# Patient Record
Sex: FEMALE | Race: WHITE | NOT HISPANIC OR LATINO | Employment: OTHER | URBAN - METROPOLITAN AREA
[De-identification: names, ages, dates, MRNs, and addresses within clinical notes are randomized per-mention and may not be internally consistent; named-entity substitution may affect disease eponyms.]

---

## 2019-10-09 ENCOUNTER — OFFICE VISIT - HEALTHEAST (OUTPATIENT)
Dept: INTERNAL MEDICINE | Facility: CLINIC | Age: 74
End: 2019-10-09

## 2019-10-09 DIAGNOSIS — F51.02 ADJUSTMENT INSOMNIA: ICD-10-CM

## 2019-10-09 DIAGNOSIS — F33.2 SEVERE RECURRENT MAJOR DEPRESSION WITHOUT PSYCHOTIC FEATURES (H): ICD-10-CM

## 2019-10-09 DIAGNOSIS — E66.811 CLASS 1 OBESITY DUE TO EXCESS CALORIES WITHOUT SERIOUS COMORBIDITY WITH BODY MASS INDEX (BMI) OF 30.0 TO 30.9 IN ADULT: ICD-10-CM

## 2019-10-09 DIAGNOSIS — Z23 NEED FOR STREPTOCOCCUS PNEUMONIAE VACCINATION: ICD-10-CM

## 2019-10-09 DIAGNOSIS — E66.09 CLASS 1 OBESITY DUE TO EXCESS CALORIES WITHOUT SERIOUS COMORBIDITY WITH BODY MASS INDEX (BMI) OF 30.0 TO 30.9 IN ADULT: ICD-10-CM

## 2019-10-09 DIAGNOSIS — Z12.11 COLON CANCER SCREENING: ICD-10-CM

## 2019-10-09 DIAGNOSIS — Z12.31 VISIT FOR SCREENING MAMMOGRAM: ICD-10-CM

## 2019-10-09 ASSESSMENT — PATIENT HEALTH QUESTIONNAIRE - PHQ9: SUM OF ALL RESPONSES TO PHQ QUESTIONS 1-9: 4

## 2019-11-11 ENCOUNTER — OFFICE VISIT - HEALTHEAST (OUTPATIENT)
Dept: INTERNAL MEDICINE | Facility: CLINIC | Age: 74
End: 2019-11-11

## 2019-11-11 ENCOUNTER — COMMUNICATION - HEALTHEAST (OUTPATIENT)
Dept: NURSING | Facility: CLINIC | Age: 74
End: 2019-11-11

## 2019-11-11 ENCOUNTER — COMMUNICATION - HEALTHEAST (OUTPATIENT)
Dept: INTERNAL MEDICINE | Facility: CLINIC | Age: 74
End: 2019-11-11

## 2019-11-11 DIAGNOSIS — E66.811 CLASS 1 OBESITY WITH BODY MASS INDEX (BMI) OF 31.0 TO 31.9 IN ADULT, UNSPECIFIED OBESITY TYPE, UNSPECIFIED WHETHER SERIOUS COMORBIDITY PRESENT: ICD-10-CM

## 2019-11-11 DIAGNOSIS — Z11.59 ENCOUNTER FOR HEPATITIS C SCREENING TEST FOR LOW RISK PATIENT: ICD-10-CM

## 2019-11-11 DIAGNOSIS — Z00.00 ROUTINE GENERAL MEDICAL EXAMINATION AT A HEALTH CARE FACILITY: ICD-10-CM

## 2019-11-11 DIAGNOSIS — M85.80 OSTEOPENIA, UNSPECIFIED LOCATION: ICD-10-CM

## 2019-11-11 DIAGNOSIS — F51.02 ADJUSTMENT INSOMNIA: ICD-10-CM

## 2019-11-11 DIAGNOSIS — F33.2 SEVERE RECURRENT MAJOR DEPRESSION WITHOUT PSYCHOTIC FEATURES (H): ICD-10-CM

## 2019-11-11 DIAGNOSIS — F17.210 CIGARETTE NICOTINE DEPENDENCE WITHOUT COMPLICATION: ICD-10-CM

## 2019-11-11 LAB
CHOLEST SERPL-MCNC: 203 MG/DL
FASTING STATUS PATIENT QL REPORTED: YES
HBA1C MFR BLD: 6 % (ref 3.5–6)
HDLC SERPL-MCNC: 67 MG/DL
LDLC SERPL CALC-MCNC: 123 MG/DL
TRIGL SERPL-MCNC: 66 MG/DL

## 2019-11-11 ASSESSMENT — ANXIETY QUESTIONNAIRES
GAD7 TOTAL SCORE: 5
6. BECOMING EASILY ANNOYED OR IRRITABLE: MORE THAN HALF THE DAYS
IF YOU CHECKED OFF ANY PROBLEMS ON THIS QUESTIONNAIRE, HOW DIFFICULT HAVE THESE PROBLEMS MADE IT FOR YOU TO DO YOUR WORK, TAKE CARE OF THINGS AT HOME, OR GET ALONG WITH OTHER PEOPLE: NOT DIFFICULT AT ALL
4. TROUBLE RELAXING: MORE THAN HALF THE DAYS
2. NOT BEING ABLE TO STOP OR CONTROL WORRYING: NOT AT ALL
5. BEING SO RESTLESS THAT IT IS HARD TO SIT STILL: NOT AT ALL
7. FEELING AFRAID AS IF SOMETHING AWFUL MIGHT HAPPEN: NOT AT ALL
1. FEELING NERVOUS, ANXIOUS, OR ON EDGE: SEVERAL DAYS
3. WORRYING TOO MUCH ABOUT DIFFERENT THINGS: NOT AT ALL

## 2019-11-11 ASSESSMENT — MIFFLIN-ST. JEOR: SCORE: 1350.78

## 2019-11-11 ASSESSMENT — PATIENT HEALTH QUESTIONNAIRE - PHQ9: SUM OF ALL RESPONSES TO PHQ QUESTIONS 1-9: 1

## 2019-11-12 LAB — HCV AB SERPL QL IA: NEGATIVE

## 2019-11-14 ENCOUNTER — COMMUNICATION - HEALTHEAST (OUTPATIENT)
Dept: INTERNAL MEDICINE | Facility: CLINIC | Age: 74
End: 2019-11-14

## 2020-08-05 ENCOUNTER — COMMUNICATION - HEALTHEAST (OUTPATIENT)
Dept: INTERNAL MEDICINE | Facility: CLINIC | Age: 75
End: 2020-08-05

## 2020-08-05 DIAGNOSIS — F33.2 SEVERE RECURRENT MAJOR DEPRESSION WITHOUT PSYCHOTIC FEATURES (H): ICD-10-CM

## 2020-08-05 RX ORDER — VENLAFAXINE HYDROCHLORIDE 75 MG/1
CAPSULE, EXTENDED RELEASE ORAL
Qty: 90 CAPSULE | Refills: 0 | Status: SHIPPED | OUTPATIENT
Start: 2020-08-05

## 2020-10-26 ENCOUNTER — COMMUNICATION - HEALTHEAST (OUTPATIENT)
Dept: INTERNAL MEDICINE | Facility: CLINIC | Age: 75
End: 2020-10-26

## 2020-11-19 ENCOUNTER — OFFICE VISIT - HEALTHEAST (OUTPATIENT)
Dept: INTERNAL MEDICINE | Facility: CLINIC | Age: 75
End: 2020-11-19

## 2020-11-19 DIAGNOSIS — Z20.822 SUSPECTED COVID-19 VIRUS INFECTION: ICD-10-CM

## 2020-11-19 DIAGNOSIS — F33.2 SEVERE RECURRENT MAJOR DEPRESSION WITHOUT PSYCHOTIC FEATURES (H): ICD-10-CM

## 2020-11-21 ENCOUNTER — AMBULATORY - HEALTHEAST (OUTPATIENT)
Dept: FAMILY MEDICINE | Facility: CLINIC | Age: 75
End: 2020-11-21

## 2020-11-21 DIAGNOSIS — Z20.822 SUSPECTED COVID-19 VIRUS INFECTION: ICD-10-CM

## 2020-11-23 ENCOUNTER — COMMUNICATION - HEALTHEAST (OUTPATIENT)
Dept: SCHEDULING | Facility: CLINIC | Age: 75
End: 2020-11-23

## 2020-11-24 ENCOUNTER — OFFICE VISIT - HEALTHEAST (OUTPATIENT)
Dept: INTERNAL MEDICINE | Facility: CLINIC | Age: 75
End: 2020-11-24

## 2020-11-24 DIAGNOSIS — Z78.0 POST-MENOPAUSAL: ICD-10-CM

## 2020-11-24 DIAGNOSIS — Z91.89 RISK FOR CORONARY ARTERY DISEASE BETWEEN 10% AND 20% IN NEXT 10 YEARS: ICD-10-CM

## 2020-11-24 DIAGNOSIS — Z87.891 ENCOUNTER FOR SCREENING FOR ABDOMINAL AORTIC ANEURYSM (AAA) IN PATIENT 50 YEARS OF AGE OR OLDER WITH HISTORY OF SMOKING: ICD-10-CM

## 2020-11-24 DIAGNOSIS — Z12.11 SCREEN FOR COLON CANCER: ICD-10-CM

## 2020-11-24 DIAGNOSIS — M85.80 OSTEOPENIA, UNSPECIFIED LOCATION: ICD-10-CM

## 2020-11-24 DIAGNOSIS — F33.2 SEVERE RECURRENT MAJOR DEPRESSION WITHOUT PSYCHOTIC FEATURES (H): ICD-10-CM

## 2020-11-24 DIAGNOSIS — Z12.11 COLON CANCER SCREENING: ICD-10-CM

## 2020-11-24 DIAGNOSIS — Z12.31 VISIT FOR SCREENING MAMMOGRAM: ICD-10-CM

## 2020-11-24 DIAGNOSIS — R73.03 PREDIABETES: ICD-10-CM

## 2020-11-24 DIAGNOSIS — E66.811 OBESITY (BMI 30.0-34.9): ICD-10-CM

## 2020-11-24 DIAGNOSIS — F17.210 CIGARETTE NICOTINE DEPENDENCE WITHOUT COMPLICATION: ICD-10-CM

## 2020-11-24 DIAGNOSIS — Z13.6 ENCOUNTER FOR SCREENING FOR ABDOMINAL AORTIC ANEURYSM (AAA) IN PATIENT 50 YEARS OF AGE OR OLDER WITH HISTORY OF SMOKING: ICD-10-CM

## 2020-11-24 LAB
ALBUMIN SERPL-MCNC: 3.9 G/DL (ref 3.5–5)
ALP SERPL-CCNC: 118 U/L (ref 45–120)
ALT SERPL W P-5'-P-CCNC: 21 U/L (ref 0–45)
ANION GAP SERPL CALCULATED.3IONS-SCNC: 10 MMOL/L (ref 5–18)
AST SERPL W P-5'-P-CCNC: 15 U/L (ref 0–40)
BILIRUB SERPL-MCNC: 0.7 MG/DL (ref 0–1)
BUN SERPL-MCNC: 14 MG/DL (ref 8–28)
CALCIUM SERPL-MCNC: 9.3 MG/DL (ref 8.5–10.5)
CHLORIDE BLD-SCNC: 106 MMOL/L (ref 98–107)
CHOLEST SERPL-MCNC: 184 MG/DL
CO2 SERPL-SCNC: 24 MMOL/L (ref 22–31)
CREAT SERPL-MCNC: 0.74 MG/DL (ref 0.6–1.1)
FASTING STATUS PATIENT QL REPORTED: NORMAL
GFR SERPL CREATININE-BSD FRML MDRD: >60 ML/MIN/1.73M2
GLUCOSE BLD-MCNC: 139 MG/DL (ref 70–125)
HDLC SERPL-MCNC: 63 MG/DL
LDLC SERPL CALC-MCNC: 108 MG/DL
POTASSIUM BLD-SCNC: 4.2 MMOL/L (ref 3.5–5)
PROT SERPL-MCNC: 7 G/DL (ref 6–8)
SODIUM SERPL-SCNC: 140 MMOL/L (ref 136–145)
TRIGL SERPL-MCNC: 65 MG/DL

## 2020-11-24 ASSESSMENT — PATIENT HEALTH QUESTIONNAIRE - PHQ9: SUM OF ALL RESPONSES TO PHQ QUESTIONS 1-9: 1

## 2020-11-24 ASSESSMENT — MIFFLIN-ST. JEOR: SCORE: 1363.85

## 2020-11-25 ENCOUNTER — AMBULATORY - HEALTHEAST (OUTPATIENT)
Dept: INTERNAL MEDICINE | Facility: CLINIC | Age: 75
End: 2020-11-25

## 2020-11-25 ENCOUNTER — COMMUNICATION - HEALTHEAST (OUTPATIENT)
Dept: INTERNAL MEDICINE | Facility: CLINIC | Age: 75
End: 2020-11-25

## 2020-11-25 DIAGNOSIS — E56.9 VITAMIN DEFICIENCY: ICD-10-CM

## 2020-11-25 DIAGNOSIS — M85.80 OSTEOPENIA, UNSPECIFIED LOCATION: ICD-10-CM

## 2020-11-25 LAB — 25(OH)D3 SERPL-MCNC: 11.1 NG/ML (ref 30–80)

## 2020-12-01 ENCOUNTER — COMMUNICATION - HEALTHEAST (OUTPATIENT)
Dept: INTERNAL MEDICINE | Facility: CLINIC | Age: 75
End: 2020-12-01

## 2020-12-11 ENCOUNTER — COMMUNICATION - HEALTHEAST (OUTPATIENT)
Dept: GERIATRIC MEDICINE | Facility: CLINIC | Age: 75
End: 2020-12-11

## 2020-12-21 ENCOUNTER — COMMUNICATION - HEALTHEAST (OUTPATIENT)
Dept: GERIATRIC MEDICINE | Facility: CLINIC | Age: 75
End: 2020-12-21

## 2021-01-15 ENCOUNTER — COMMUNICATION - HEALTHEAST (OUTPATIENT)
Dept: ADMINISTRATIVE | Facility: CLINIC | Age: 76
End: 2021-01-15

## 2021-03-11 ENCOUNTER — COMMUNICATION - HEALTHEAST (OUTPATIENT)
Dept: GERIATRIC MEDICINE | Facility: CLINIC | Age: 76
End: 2021-03-11

## 2021-03-17 ENCOUNTER — AMBULATORY - HEALTHEAST (OUTPATIENT)
Dept: NURSING | Facility: CLINIC | Age: 76
End: 2021-03-17

## 2021-04-07 ENCOUNTER — AMBULATORY - HEALTHEAST (OUTPATIENT)
Dept: NURSING | Facility: CLINIC | Age: 76
End: 2021-04-07

## 2021-05-26 ASSESSMENT — PATIENT HEALTH QUESTIONNAIRE - PHQ9
SUM OF ALL RESPONSES TO PHQ QUESTIONS 1-9: 4
SUM OF ALL RESPONSES TO PHQ QUESTIONS 1-9: 1

## 2021-05-27 ASSESSMENT — PATIENT HEALTH QUESTIONNAIRE - PHQ9: SUM OF ALL RESPONSES TO PHQ QUESTIONS 1-9: 1

## 2021-05-28 ASSESSMENT — ANXIETY QUESTIONNAIRES: GAD7 TOTAL SCORE: 5

## 2021-05-30 ENCOUNTER — RECORDS - HEALTHEAST (OUTPATIENT)
Dept: ADMINISTRATIVE | Facility: CLINIC | Age: 76
End: 2021-05-30

## 2021-06-02 ENCOUNTER — RECORDS - HEALTHEAST (OUTPATIENT)
Dept: ADMINISTRATIVE | Facility: CLINIC | Age: 76
End: 2021-06-02
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
## 2021-06-03 VITALS
OXYGEN SATURATION: 95 % | HEART RATE: 75 BPM | SYSTOLIC BLOOD PRESSURE: 136 MMHG | WEIGHT: 189 LBS | BODY MASS INDEX: 31.45 KG/M2 | DIASTOLIC BLOOD PRESSURE: 76 MMHG

## 2021-06-03 VITALS
OXYGEN SATURATION: 94 % | HEART RATE: 92 BPM | WEIGHT: 189.12 LBS | SYSTOLIC BLOOD PRESSURE: 128 MMHG | BODY MASS INDEX: 31.51 KG/M2 | DIASTOLIC BLOOD PRESSURE: 70 MMHG | HEIGHT: 65 IN

## 2021-06-03 NOTE — PROGRESS NOTES
Patient's consent lists sister as primary contact but does not include her phone number. CHW completed a chart review and discovered [facility] Telephone Encounter from 9/27:  Currently in inpatient and does not have a phone or number. If there are any question contact  at [facility] antonio [phone]. Routed message to PCP that patient cannot be reached via telephone or MyChart at this time.

## 2021-06-03 NOTE — PROGRESS NOTES
She is not an inpatient as I just saw her this afternoon.  She however endorsed that she does not have a current phone and is working on getting that resolved.  I hope this helps.

## 2021-06-03 NOTE — TELEPHONE ENCOUNTER
Refill Approved    Rx renewed per Medication Renewal Policy. Medication was last renewed on .  traZODone (DESYREL) 50 MG tablet 15 tablet 0 11/11/2019     Madelyn Rodrigues, [facility] Connection Triage/Med Refill 11/12/2019     Requested Prescriptions   Pending Prescriptions Disp Refills     traZODone (DESYREL) 50 MG tablet [Pharmacy Med Name: TRAZODONE 50MG TABLETS] 45 tablet 0     Sig: TAKE ONE-HALF TABLET AT BEDTIME AS NEEDED FOR SLEEP       Tricyclics/Misc Antidepressant/Antianxiety Meds Refill Protocol Passed - 11/11/2019  2:13 PM        Passed - PCP or prescribing provider visit in last year     Last office visit with prescriber/PCP: 10/9/2019 Aftab Clayton MD OR same dept: 10/9/2019 Aftab Clayton MD OR same specialty: 10/9/2019 Aftab Clayton MD  Last physical: 11/11/2019 Last MTM visit: Visit date not found   Next visit within 3 mo: Visit date not found  Next physical within 3 mo: Visit date not found  Prescriber OR PCP: Aftab Clayton MD  Last diagnosis associated with med order: 1. Adjustment insomnia  - traZODone (DESYREL) 50 MG tablet [Pharmacy Med Name: TRAZODONE 50MG TABLETS]; TAKE ONE-HALF TABLET AT BEDTIME AS NEEDED FOR SLEEP  Dispense: 45 tablet; Refill: 0    If protocol passes may refill for 12 months if within 3 months of last provider visit (or a total of 15 months).

## 2021-06-03 NOTE — PROGRESS NOTES
Assessment and Plan:   1. Adjustment insomnia  Well controlled.  Counseled patient to only use on a as needed basis.  Counseled to minimize alcohol use as much as possible.  - traZODone (DESYREL) 50 MG tablet; Take 0.5 tablets (25 mg total) by mouth at bedtime as needed for sleep (take only 1 tab in a 24 hour period).  Dispense: 15 tablet; Refill: 0    2. Severe recurrent major depression without psychotic features (H)  Well-controlled.  Counseled patient to consider establishing care with therapist.  - venlafaxine (EFFEXOR-XR) 75 MG 24 hr capsule; Take 1 capsule (75 mg total) by mouth daily with breakfast.  Dispense: 60 capsule; Refill: 2    3. Routine general medical examination at a health care facility  Have already placed consults for mammogram and for colonoscopy. Referred patient to care coordination in light of strong likelihood that she would not follow through with setting up either of the studies. Also provided instructions regarding establishing boosters for Tdap and administration of shingles vaccine from her pharmacy of choice and to have those records sent back to us once administered.    - Ambulatory referral to Care Management (Primary Care)    4. Osteopenia, unspecified location  Trying to obtain records from DEXA scan from 2009.  Records now show that she is losing height and has not been on calcium vitamin D supplement for quite some time. Might require repeat DEXA scan depending on what we find  - calcium-vitamin D (CALCIUM-VITAMIN D) 500 mg(1,250mg) -200 unit per tablet; Take 1 tablet by mouth 2 (two) times a day with meals.  Dispense: 100 tablet; Refill: 2    5. Class 1 obesity with body mass index (BMI) of 31.0 to 31.9 in adult, unspecified obesity type, unspecified whether serious comorbidity present  Family history of diabetes and no longer taking 81 mg aspirin.  - Glycosylated Hemoglobin A1c  - Lipid Cascade    6. Encounter for hepatitis C screening test for low risk patient  - Hepatitis  C Antibody (Anti-HCV)    7. Cigarette nicotine dependence without complication  Counseled patient on smoking cessation.  She is in the precontemplation stage.  Encouraged her to set goals for decreasing her use and subsequent risk of malignancy especially in the setting of a family history of laryngeal and lung cancer.    The patient's current medical problems were reviewed.    I have had an Advance Directives discussion with the patient.  I have counseled the patient for tobacco cessation and the follow up will occur  at the next visit.  The following health maintenance schedule was reviewed with the patient and provided in printed form in the after visit summary:   Health Maintenance   Topic Date Due     HEPATITIS C SCREENING  1945     TD 18+ HE  07/26/1964     ADVANCE CARE PLANNING  07/26/1964     COLONOSCOPY  07/26/1996     ZOSTER VACCINES (1 of 2) 07/26/1996     MEDICARE ANNUAL WELLNESS VISIT  07/26/2011     DXA SCAN  07/26/2011     MAMMOGRAM  09/24/2012     INFLUENZA VACCINE RULE BASED (1) 08/01/2019     DEPRESSION FOLLOW UP  05/11/2020     FALL RISK ASSESSMENT  11/11/2020     PNEUMOCOCCAL IMMUNIZATION 65+ LOW/MEDIUM RISK  Completed        Subjective:   Chief Complaint: Felisha Brown is an 74 y.o. female here for an Annual Wellness visit.   HPI: Annual wellness visit and physical exam for this 74-year-old female. History pertinent for severe major depression, adjustment insomnia, generalized anxiety disorder, cigarette/nicotine dependence, osteopenia (DEXA 2009) and obesity. Chart review and review from primary care notes from 2010 and 2011 indicates she was on aspirin 81 mg daily and calcium-vitamin D supplements 3 times daily.  Last mammogram in 2009 was reportedly normal.  Only on trazodone and Effexor-XR for many years now. Received PCV 13 on 10/13/19, due for a screening mammogram and overdue for colonoscopy.  Noted to have 1 sigmoid and one rectal polyp in 2009 with recommendation to repeat in  5 years (not done).  Tdap on 8/3/2009. Never received shingles vaccine to date. CT head July 29, 2019- for mass, infarct, hemorrhage or encephalomalacia.. She does not have a phone at the moment, and endorses that currently there is no way to get a hold of her. Typically uses a trackfone    Fasting today (only tea with sugar this am). Continues to smoke 1 ppd, down from 1.5 ppd.  Endorses the ability to decrease cigarette use, as she abstained for 10 days during her most recent hospital stay.  Concerned she may have a hemorrhoid 2/2 noticing some blood in the tissue with wiping, which only happened today. Awaiting to finalize work with [facility] for transportation to set up the mammogram. Denies being contacted regarding a colonoscopy. Denies pencil thin stool and no breast masses. Extensive family history of cancer. No ns/f/c, or unintentional weight loss. Trazodone helps with sleep. No HI/SI. PHQ9 1 and GAD7 5. She did not bring her glasses with her today. Endorses foggyness in her right eye, and states she needs to see the eye doctor. Thinks it has been at least 10 years since she last saw the eye doctors. No recent shortness of breath or CP. Declined a flu vaccine today. No concerns regarding moles. Timed chair stands 11 in 30 seconds.     Review of Systems: Please see above.  The rest of the review of systems are negative for all systems.    Patient Care Team:  Aftab Clayton MD as PCP - General (Internal Medicine)     Patient Active Problem List   Diagnosis     Severe recurrent major depression without psychotic features (H)     Generalized anxiety disorder     Cigarette nicotine dependence without complication     Class 1 obesity in adult     No past medical history on file.   Past Surgical History:   Procedure Laterality Date     HYSTERECTOMY      mid 1980s      Family History   Problem Relation Age of Onset     Stomach cancer Mother      Cancer Father         spinal cancer     Stomach cancer Sister  "     Lung cancer Sister         laryngeal cancer      Social History     Socioeconomic History     Marital status:    Tobacco Use     Smoking status: Current Every Day Smoker     Packs/day: 1.50     Years: 45.00     Pack years: 67.50     Smokeless tobacco: Never Used   Substance and Sexual Activity     Alcohol use: Yes     Alcohol/week: 3.0 standard drinks     Types: 3 Cans of beer per week     Frequency: 2-4 times a month     Drug use: Never      Current Outpatient Medications   Medication Sig Dispense Refill     calcium-vitamin D (CALCIUM-VITAMIN D) 500 mg(1,250mg) -200 unit per tablet Take 1 tablet by mouth 2 (two) times a day with meals. 100 tablet 2     traZODone (DESYREL) 50 MG tablet Take 0.5 tablets (25 mg total) by mouth at bedtime as needed for sleep (take only 1 tab in a 24 hour period). 15 tablet 0     venlafaxine (EFFEXOR-XR) 75 MG 24 hr capsule Take 1 capsule (75 mg total) by mouth daily with breakfast. 60 capsule 2     No current facility-administered medications for this visit.       Objective:   Vital Signs:   Visit Vitals  /70 (Patient Site: Left Arm, Patient Position: Sitting, Cuff Size: Adult Regular)   Pulse 92   Ht 5' 4.5\" (1.638 m)   Wt 189 lb 1.9 oz (85.8 kg)   SpO2 94%   BMI 31.96 kg/m         VisionScreening:  No exam data present . Did not have her glasses with her today    PHYSICAL EXAM  GENERAL APPEARANCE: Pleasant, nontoxic-appearing, no acute distress   EYES: Eyelids, conjunctiva, and sclera were normal. PERRLA.    HEAD, EARS, NOSE, MOUTH, AND THROAT: Head and face were normal. Hearing was normal to voice and the ears were normal to external exam. TM intact and normal bilaterally.  Nose appearance was normal and there was no discharge. Oropharynx was normal and with ulcers. Upper dentures (did not remove).    RESPIRATORY: Bilaterally with no crackles, wheezing or rhonchi  CARDIOVASCULAR: Regular S1 and S2.  Radial pulses intact.  No edema.  GASTROINTESTINAL: NABS. Soft. No " organ enlargement or tenderness.  No tenderness, mass, or enlarged organs.   MUSCULOSKELETAL: Skeletal configuration was normal and muscle mass was normal for age. Joint appearance was overall normal.  No calf swelling/tenderness or any pitting edema.  11 chair stands in 30 seconds.  LYMPHATIC: There were no enlarged axillary nodes.  SKIN/HAIR/NAILS: Skin color was normal.  There were no skin lesions.  Calluses on the soles of her feet bilaterally.  No maceration or ulcers in between the toes or on the soles of the feet.  Sensation grossly intact in feet and legs.  NEUROLOGIC: Alert and oriented x4. Speech was normal. Cranial nerves were normal. Motor strength was normal for age.  No focal deficits  PSYCHIATRIC:  Mood and affect were normal and the patient had normal recent and remote memory. The patient's judgment and insight were normal.  CHEST WALL/BREASTS: No palpable breast mass or axillary adenopathy bilaterally  RECTAL: small minor anal fissure but no external hemorrhoids, rash    Assessment Results 11/11/2019   Activities of Daily Living No help needed   Instrumental Activities of Daily Living No help needed   Get Up and Go Score Less than 12 seconds   Mini Cog Total Score 4   Some recent data might be hidden     A Mini-Cog score of 0-2 suggests the possibility of dementia, score of 3-5 suggests no dementia    Identified Health Risks:     The patient reports that she drinks more than one alcoholic drink per day but denies binge or excessive drinking. She was counseled and given information about possible harmful effects of excessive alcohol intake.  The patient was counseled and encouraged to consider modifying their diet and eating habits. She was provided with information on recommended healthy diet options.  Information on urinary incontinence and treatment options given to patient.  The patient was provided with suggestions to help her develop a healthy emotional lifestyle.   Information regarding  advance directives (living dempsey), given the appropriate form and referred to care coordination. Additional information was provided to the patient via the AVS.

## 2021-06-05 VITALS
SYSTOLIC BLOOD PRESSURE: 120 MMHG | TEMPERATURE: 97.1 F | OXYGEN SATURATION: 95 % | HEIGHT: 65 IN | WEIGHT: 192 LBS | BODY MASS INDEX: 31.99 KG/M2 | HEART RATE: 98 BPM | DIASTOLIC BLOOD PRESSURE: 76 MMHG

## 2021-06-10 NOTE — TELEPHONE ENCOUNTER
Refill Approved    Rx renewed per Medication Renewal Policy. Medication was last renewed on 11/11/19.    Zee Mckeon, [facility] Triage/Med Refill 8/5/2020     Requested Prescriptions   Pending Prescriptions Disp Refills     venlafaxine (EFFEXOR-XR) 75 MG 24 hr capsule [Pharmacy Med Name: VENLAFAXINE ER 75MG CAPSULES] 60 capsule 2     Sig: TAKE 1 CAPSULE(75 MG) BY MOUTH DAILY WITH BREAKFAST       Venlafaxine/Desvenlafaxine Refill Protocol Passed - 8/5/2020  3:19 AM        Passed - LFT or AST or ALT in last year     Albumin   Date Value Ref Range Status   09/23/2019 3.4 (L) 3.5 - 5.0 g/dL Final     Bilirubin, Total   Date Value Ref Range Status   09/23/2019 0.4 0.0 - 1.0 mg/dL Final     Bilirubin, Direct   Date Value Ref Range Status   09/23/2019 0.2 <=0.5 mg/dL Final     Alkaline Phosphatase   Date Value Ref Range Status   09/23/2019 101 45 - 120 U/L Final     AST   Date Value Ref Range Status   09/23/2019 15 0 - 40 U/L Final     ALT   Date Value Ref Range Status   09/23/2019 17 0 - 45 U/L Final     Protein, Total   Date Value Ref Range Status   09/23/2019 6.7 6.0 - 8.0 g/dL Final                Passed - Fasting lipid cascade in last year     Cholesterol   Date Value Ref Range Status   11/11/2019 203 (H) <=199 mg/dL Final     Triglycerides   Date Value Ref Range Status   11/11/2019 66 <=149 mg/dL Final     HDL Cholesterol   Date Value Ref Range Status   11/11/2019 67 >=50 mg/dL Final     LDL Calculated   Date Value Ref Range Status   11/11/2019 123 <=129 mg/dL Final     Patient Fasting > 8hrs?   Date Value Ref Range Status   11/11/2019 Yes  Final             Passed - PCP or prescribing provider visit in last year     Last office visit with prescriber/PCP: 10/9/2019 Aftab Clayton MD OR same dept: 10/9/2019 Aftab Clayton MD OR same specialty: 10/9/2019 Aftab Clayton MD  Last physical: 11/11/2019 Last MTM visit: Visit date not found   Next visit within 3 mo: Visit date not  found  Next physical within 3 mo: Visit date not found  Prescriber OR PCP: Aftab Clayton MD  Last diagnosis associated with med order: 1. Severe recurrent major depression without psychotic features (H)  - venlafaxine (EFFEXOR-XR) 75 MG 24 hr capsule [Pharmacy Med Name: VENLAFAXINE ER 75MG CAPSULES]; TAKE 1 CAPSULE(75 MG) BY MOUTH DAILY WITH BREAKFAST  Dispense: 60 capsule; Refill: 2    If protocol passes may refill for 12 months if within 3 months of last provider visit (or a total of 15 months).             Passed - Blood Pressure in last year     BP Readings from Last 1 Encounters:   11/11/19 128/70

## 2021-06-13 NOTE — PROGRESS NOTES
Assessment:    Healthy female exam.      Plan:   1. Visit for screening mammogram  Many years overdue but otherwise asymptomatic.  Declined exam today.  Unsure if patient will follow through with this referral  - Mammo Screening Bilateral; Future    2. Colon cancer screening  3. Screen for colon cancer  - Cologuard    4. Post-menopausal  She has a distant documented history of osteopenia based on DEXA scan in 2010.  Calcium is normal today. Obesity is a risk factor for low vitamin D and will check today.  - DXA Bone Density Scan; Future  - Vitamin D 25-OH    5. Risk for coronary artery disease between 10% and 20% in next 10 years  8. Prediabetes  Mini cog of 3/5 years worth close monitoring. July 2019 CT head was read to show normal parenchymal density for age and no hydrocephalus.  FLP is actually quite good today, however fasting glucose elevated.  Will check A1c.  At risk for some vascular disease in the setting of her coronary risk factors, more specifically smoking.  Her weight is stable which is reassuring. I recommended she start working on an advanced directive, which she did not want to discuss today.  - Lipid Profile  - Comprehensive Metabolic Panel    6. Cigarette nicotine dependence without complication  7. Encounter for screening for abdominal aortic aneurysm (AAA) in patient 50 years of age or older with history of smoking  Disinterested in pursuing tobacco cessation in any form.  Amenable to low-dose lung screening CT scan and one-time abdominal aortic ultrasound.  No formal diagnosis of COPD/bronchitis, or hypertension.  - CT Low Dose Lung Screening Chest; Future  - US Abdominal Aorta; Future    9.  Severe recurrent major depression without psychotic features  Continue venlafaxine.  Reassuring that her weight remains stable.  No thoughts of harming herself or anyone else.  She is currently not on trazodone.  We will need to closely monitor in the setting of her mini cog score and concern about  pseudodementia, despite score of 1 on PHQ-9.    Subjective:      Felisha Brown is a 75 y.o. female who presents for an annual exam. The patient is not sexually active. The patient participates in regular exercise: walking. The patient reports that there is not domestic violence in her life.   History of adjustment insomnia, severe major depression, osteopenia, class I obesity, cigarette nicotine dependence.  Overdue for mammogram that was ordered in October 2019.  10-year ASCVD event risk of 20.9 with optimal at 10.8.  Only on venlafaxine.  Does not appear she has had a colonoscopy.   Last seen in November 2019 for physical.  At that time, we discussed tobacco cessation (1 pack/day), DEXA scan/calcium and vitamin D supplementation, for depression on venlafaxine, insomnia on trazodone.  No SI/HI.  Did not follow through with mammogram.   unremarkable.   Declines flu shot. Open to cologuard. Will be seeing eye doctor 12/9/20 for cataract in the right eye. Open to undergoing a mammogram. Now smoking 0.5 ppd, and has been smoking for roughly 58 years. Weight up 3 lbs from last year and denies f/s/c.     Healthy Habits:   Regular Exercise: Yes  Sunscreen Use: No  Healthy Diet: Yes  Dental Visits Regularly: Yes  Seat Belt: Yes  Sexually active: No  Self Breast Exam Monthly:No  Hemoccults: No  Flex Sig: No  Colonoscopy: No  Lipid Profile: No  Glucose Screen: No  Prevention of Osteoporosis: No  Last Dexa: DUE  Guns at Home:  No    Immunization History   Administered Date(s) Administered     Pneumo Conj 13-V (2010&after) 10/09/2019     Pneumo Polysac 23-V 08/09/2011     Immunization status: up to date and documented, Refuses Immunization for shingles and influenza.    No exam data present    Gynecologic History  No LMP recorded.  Contraception: status post hysterectomy  Last Pap: N/A   Last mammogram: 2010 from chart review.  Results were: normal      OB History   No obstetric history on file.       Current  "Outpatient Medications   Medication Sig Dispense Refill     venlafaxine (EFFEXOR-XR) 75 MG 24 hr capsule TAKE 1 CAPSULE(75 MG) BY MOUTH DAILY WITH BREAKFAST 90 capsule 0     No current facility-administered medications for this visit.      No past medical history on file.  Past Surgical History:   Procedure Laterality Date     HYSTERECTOMY      mid 1980s     Patient has no known allergies.  Family History   Problem Relation Age of Onset     Stomach cancer Mother      Cancer Father         spinal cancer     Stomach cancer Sister      Lung cancer Sister         laryngeal cancer     Socioeconomic History     Marital status:      Smoking status: Current Every Day Smoker     Packs/day: 1.50     Years: 45.00     Pack years: 67.50     Smokeless tobacco: Never Used   Substance and Sexual Activity     Alcohol use: Yes     Alcohol/week: 3.0 standard drinks     Types: 3 Cans of beer per week     Frequency: 2-4 times a month     Drug use: Never    19.1% risk of ASCVD event in 10 years calculated on 11/13/2019 . Has 5 daughters and 21 grandchildren. Used to work for [employer]      ROS A comprehensive review of systems was performed and was otherwise negative    Objective:      Vitals:    11/24/20 0919   BP: 120/76   Pulse: 98   Temp: 97.1  F (36.2  C)   TempSrc: Tympanic   SpO2: 95%   Weight: 192 lb (87.1 kg)   Height: 5' 4.5\" (1.638 m)     Body mass index is 32.45 kg/m .    GENERAL APPEARANCE: Pleasant, nontoxic-appearing, no acute distress. Strong smell of cigarette smoke.   EYES: Eyelids, conjunctiva, and sclera were normal. Pupils were normal. Left cataract.   HEENT: Head normal. Hearing was normal to voice. TM and external canal normal bilaterally  NECK: Neck appearance was normal.    RESPIRATORY: Bilaterally with no crackles, wheezing or rhonchi  CARDIOVASCULAR: Regular S1 and S2.  Radial pulses intact.  No edema.  GASTROINTESTINAL: NABS, soft, NT, ND, no HSM  MUSCULOSKELETAL: Skeletal configuration " was normal and muscle mass was normal for age. Mild OA changes in the hands bilaterally.  SKIN/HAIR/NAILS: Skin color was normal.  There were no concerning skin lesions on the back or upper and lower extremities. Hair and nails were normal.  NEUROLOGIC: Alert and oriented to person, place, time, and circumstance. Speech was normal. Motor strength was normal for age. Minicog 3/5   PSYCHIATRIC:  Mood and affect were normal and the patient had normal recent and remote memory.  CHEST WALL/BREASTS: patient declined.

## 2021-06-13 NOTE — PROGRESS NOTES
[facility] Care Coordination Contact    Called member ([phone]) to schedule annual HRA home visit. Member's cell phone # does not have a vm set up and CC is not able to leave a vm message.  Will try again at a later time.     Jose Mata RN, BSN, PHN  [facility] Care Coordinator  Phone: [phone]  Fax [phone]   [email or web address]

## 2021-06-13 NOTE — PROGRESS NOTES
"Felisha Brown is a 75 y.o. female who is being evaluated via a billable telephone visit.      The patient has been notified of following:     \"This telephone visit will be conducted via a call between you and your physician/provider. We have found that certain health care needs can be provided without the need for a physical exam.  This service lets us provide the care you need with a short phone conversation.  If a prescription is necessary we can send it directly to your pharmacy.  If lab work is needed we can place an order for that and you can then stop by our lab to have the test done at a later time.    Telephone visits are billed at different rates depending on your insurance coverage. During this emergency period, for some insurers they may be billed the same as an in-person visit.  Please reach out to your insurance provider with any questions.    If during the course of the call the physician/provider feels a telephone visit is not appropriate, you will not be charged for this service.\"    Patient has given verbal consent to a Telephone visit? Yes    What phone number would you like to be contacted at? [phone]    Patient would like to receive their AVS by AVS Preference: Mail a copy.    Additional provider notes: Subjective: Ms. Brown was last seen in November 2019 for physical.  At that time, we discussed tobacco cessation (1 pack/day), DEXA scan/calcium and vitamin D supplementation, for depression on venlafaxine, insomnia on trazodone.  Did not follow through with mammogram.   unremarkable   Notes that she feels burnt out, with the last couple of days being very rough.  Endorses having a productive cough for the last day.  Also going through some issues with her rent payments. Does not intend to receive a flu shot this season. Denies f/s/c. Endorses fatigue, and she is watching for the symptoms. She remains on the venlafaxine. Notes that she was mad this morning with what's going on with " her. No SI/HI. Staying well hydrated. Denies any exposure to people dx with covid and is avoid social groups.  Notes that she has cut down her to regular use to 0.5 PPD.    Concern for COVID-19  About how many days ago did these symptoms start? 1 day  Is this your first visit for this illness? Yes  In the 14 days before your symptoms started, have you had close contact with someone with COVID-19 (Coronavirus)? I do not know. She does take the bus for transportation.  Do you have a fever or chills? No  Are you having new or worsening difficulty breathing? Yes     Please describe what kind of difficulty you are having breathing. No dyspnea, or dyspnea at patients normal baseline  Do you have new or worsening cough? Yes, I am coughing up mucus.  Have you had any new or unexplained body aches? YES  Have you experienced any of the following NEW symptoms?    Headache: No    Sore throat: No    Loss of taste or smell: No    Chest pain: No    Diarrhea: YES    Rash: No  What treatments have you tried? no  Who do you live with? alone  Are you, or a household member, a healthcare worker or a ? No  Do you live in a nursing home, group home, or shelter? No  Do you have a way to get food/medications if quarantined? Yes, I have [facility] mental health team members who can help me.    Assessment/Plan:  1. Suspected COVID-19 virus infection  Commended her on decreasing her tobacco use and recommended that she continue to eat well and stay hydrated.  Counseled to use acetaminophen as needed and she will let us know if her symptoms worsen.  She will be contacted regarding Covid test   - Symptomatic COVID-19 Virus (CORONAVIRUS) PCR; Future    2. Severe recurrent major depression without psychotic features (H)  She will continue to take the venlafaxine as prescribed.  Appears to be tolerating it well.  Denied suicidal or homicidal ideations.    Phone call duration:  13 minutes

## 2021-06-14 NOTE — TELEPHONE ENCOUNTER
Arcelia with [facility] asked to relay a message to Dr. Clayton.    Tim reported to Arcelia has not been taking her medication. She stopped taking medication in October and reports that it does not make any difference.     Arcelia observed - she has become more irritable    Patient notices she is more irritable and reports it is not a concerning level     Arcelia can be reached at

## 2021-06-14 NOTE — PROGRESS NOTES
"[facility] Care Coordination Contact    Per CC, mailed client an \"Unable to Contact\" letter.    Stephanie Stewart  Care Management Specialist   [facility]   [phone]        "

## 2021-06-14 NOTE — PROGRESS NOTES
[facility] Care Coordination Contact  Called member ([phone]) to schedule annual HRA home visit. Member's cell phone # does not have a vm set up and CC is not able to leave a vm message. There are no emergency  listed in Felisha's chart.  Request for CMS to mail out the Unable to Reach letter to Felisha today.   Completed 4 attempts to reach client with no response.  Member is officially unable to contact effective today.  Completed MMIS entry.  Completed health plan required [facility] POC.    Follow-up Plan: CC will attempt to reach member in six months.      Jose Mata RN, BSN, PHN  [facility] Care Coordinator  Phone: [phone]  Fax [phone]   [email or web address]

## 2021-06-14 NOTE — PROGRESS NOTES
[facility] Care Coordination Contact     Called member ([phone]) to schedule annual HRA home visit. Member's cell phone # does not have a vm set up and CC is not able to leave a vm message.  Will try again at a later time.      Jose Mata RN, BSN, PHN  [facility] Care Coordinator  Phone: [phone]  Fax [phone]   [email or web address]

## 2021-06-15 NOTE — PROGRESS NOTES
[facility] Care Coordination    CC t/c to Felisha several times today to confirm which PCC location she has selected since the [facility] (her former clinic) was closed. There is no answer and unable to leave vm message due no vm .    Jose Mata RN, BSN, PHN  [facility] Care Coordinator  Phone: [phone]  Fax [phone]   [email or web address]

## 2021-06-16 PROBLEM — E66.811 CLASS 1 OBESITY IN ADULT: Status: ACTIVE | Noted: 2019-10-09

## 2021-06-16 PROBLEM — F41.1 GENERALIZED ANXIETY DISORDER: Status: ACTIVE | Noted: 2019-09-23

## 2021-06-16 PROBLEM — F33.2 SEVERE RECURRENT MAJOR DEPRESSION WITHOUT PSYCHOTIC FEATURES (H): Status: ACTIVE | Noted: 2019-09-23

## 2021-06-19 NOTE — LETTER
Letter by Aftab Clayton MD at      Author: Aftab Clayton MD Service: -- Author Type: --    Filed:  Encounter Date: 11/14/2019 Status: Signed         Felisha Brown  [address]  [place] [address]     November 14, 2019     Dear Ms. Brown,    Below are the results from your recent visit:    Resulted Orders   Glycosylated Hemoglobin A1c   Result Value Ref Range    Hemoglobin A1c 6.0 3.5 - 6.0 %   Hepatitis C Antibody (Anti-HCV)   Result Value Ref Range    Hepatitis C Ab Negative Negative   Lipid Cascade   Result Value Ref Range    Cholesterol 203 (H) <=199 mg/dL    Triglycerides 66 <=149 mg/dL    HDL Cholesterol 67 >=50 mg/dL    LDL Calculated 123 <=129 mg/dL    Patient Fasting > 8hrs? Yes      Negative for hepatitis C and the A1c indictates that you have prediabetes.  Most importantly, your cholesterol is quite elevated and because of the fact that she continue to smoke, you have a 19.1% risk of major cardiac event in the next 10 years. I strongly recommend that we start you on a moderate intensity statin for this risk.  I also would recommend we strongly address your cigarette use as they are things that can be done to help cut down the risk of a major event.  Please schedule a follow-up appointment with me in the next 2 to 3 weeks (early December 2019) for us to further discuss your cholesterol, prediabetes and cigarette use.  This is very important.  Please call with questions or contact us using [employer].    Sincerely,    Electronically signed by Aftab Clayton MD

## 2021-06-21 NOTE — LETTER
Letter by Aftab Clayton MD at      Author: Aftab Clayton MD Service: -- Author Type: --    Filed:  Encounter Date: 12/1/2020 Status: (Other)         12/01/20      Felisha Brown  [address]  [place] [address]    Dear Felisha,    As a valued [facility] patient, your healthcare needs are our priority. Our clinic records indicate we have attempted to contact you to schedule imaging ordered by your primary care provider, but we have not heard back from you. If you wish to schedule your appointment please contact our office at your convenience. If you have already made an appointment, please disregard this letter. We can be reached at: [phone]    Sincerely,    JUAN Schrader

## 2021-06-21 NOTE — LETTER
Letter by Aftab Clayton MD at      Author: Aftab Clayton MD Service: -- Author Type: --    Filed:  Encounter Date: 11/25/2020 Status: (Other)         Felisha Brown  [address]  [place] [address]             November 25, 2020         Dear Ms. Brown,    Below are the results from your recent visit:    Resulted Orders   Lipid Profile   Result Value Ref Range    Triglycerides 65 <=149 mg/dL    Cholesterol 184 <=199 mg/dL    LDL Calculated 108 <=129 mg/dL    HDL Cholesterol 63 >=50 mg/dL    Patient Fasting > 8hrs? Unknown    Comprehensive Metabolic Panel   Result Value Ref Range    Sodium 140 136 - 145 mmol/L    Potassium 4.2 3.5 - 5.0 mmol/L    Chloride 106 98 - 107 mmol/L    CO2 24 22 - 31 mmol/L    Anion Gap, Calculation 10 5 - 18 mmol/L    Glucose 139 (H) 70 - 125 mg/dL    BUN 14 8 - 28 mg/dL    Creatinine 0.74 0.60 - 1.10 mg/dL    GFR MDRD Af Amer >60 >60 mL/min/1.73m2    GFR MDRD Non Af Amer >60 >60 mL/min/1.73m2    Bilirubin, Total 0.7 0.0 - 1.0 mg/dL    Calcium 9.3 8.5 - 10.5 mg/dL    Protein, Total 7.0 6.0 - 8.0 g/dL    Albumin 3.9 3.5 - 5.0 g/dL    Alkaline Phosphatase 118 45 - 120 U/L    AST 15 0 - 40 U/L    ALT 21 0 - 45 U/L    Narrative    Fasting Glucose reference range is 70-99 mg/dL per  American Diabetes Association (ADA) guidelines.   Vitamin D, Total (25-Hydroxy)   Result Value Ref Range    Vitamin D, Total (25-Hydroxy) 11.1 (L) 30.0 - 80.0 ng/mL    Narrative    Deficiency <10.0 ng/mL  Insufficiency 10.0-29.9 ng/mL  Sufficiency 30.0-80.0 ng/mL  Toxicity (possible) >100.0 ng/mL       Please contact patient to let her know she is severely vitamin D deficient.  I have started her on 50,000 vitamin D2 weekly x12 weeks.  After which she should start taking calcium vitamin D supplement twice daily.  Both prescriptions have been sent to her pharmacy.  Thank you     Please call with questions or contact us using [employer].    Sincerely,        Electronically signed by  Aftab Clayton MD

## 2021-06-21 NOTE — LETTER
Letter by Jose Mata RN at      Author: Jose Mata RN Service: -- Author Type: --    Filed:  Encounter Date: 12/21/2020 Status: (Other)       December 24, 2020    Important [facility] Information    FELISHA LANDON  [address]  [place] [address]  I've Tried to Contact You  Dear Felisha,  My name is Esperanza Garcia RN, and I am your Care Coordinator. I have been trying to contact you, but have not been able to reach you.  As a Care Coordinator, I am here to help. My role is to make sure that your health plan is working for you. I am available to:     Review your health care needs with you over the phone or in-person     Provide support for and information about covered services or supplies to help keep you safe and healthy in your home    Answer questions about your insurance     Help you find a provider, such as a doctor or dentist, to meet your unique needs  I can also help you schedule a free physical at your clinic. To schedule an appointment, please call me at [phone] Monday-Friday between 8am-5pm TTY/TDD: 716.    Questions?  Please call me at the phone number listed above. If youd like, a friend or family member may call for you.  For general questions, call [employer] Customer Service at [phone] or [phone] (toll free) from 8 a.m. - 8 p.m. Central, seven days a week. Access to representatives may be limited at times. TTY/TDD: 711.  Sincerely,      Jose Mata RN    E-mail: [email or web address]  Phone: [phone]      [email or web address]          cc: member records                                Civil Rights Notice  Discrimination is against the law. [employer] does not discriminate on the basis of any of the following:    Race    Color    National Origin    Creed    Adventist    Age    Public Assistance Status    Receipt of Health Care Services    Disability (including physical or mental impairment)    Sex (including sex stereotypes and gender identity)    Marital  Status    Political Beliefs    Medical Condition    Genetic Information    Sexual Orientation    Claims Experience    Medical History    Health Status    Auxiliary Aids and Services:  [employer] provides auxiliary aids and services, like qualified interpreters or information in accessible formats, free of charge and in a timely manner, to ensure an equal opportunity to participate in our health care programs. Contact [employer] Customer Service at [employer]/contact medicaid or call [phone] (toll free); TTY:711 or at [employer]/contactmedicaid.    Language Assistance Services:  [employer] provides translated documents and spoken language interpreting, free of charge and in a timely manner, when language assistance services are necessary to ensure limited English speakers have meaningful access to our information and services. Contact [employer] at [phone] (toll free); TTY: 221 or [employer]/contactmedicaid.     Civil Rights Complaints  You have the right to file a discrimination complaint if you believe you were treated in a discriminatory way by [employer]. You may contact any of the following four agencies directly to file a discrimination complaint.    U.S. Department of Health and Human Services Office for Civil Rights (OCR)  You have the right to file a complaint with the OCR, a federal agency, if you believe you have been discriminated against because of any of the following:    Race    Disability    Color    Sex    National Origin    Age      Contact the OCR directly to file a complaint:         Director         U.S. Department of Health and Human Services Office for Civil Rights         [address] [place] [address], [place] [address]         [phone] (Voice)         [phone] (TDD)         Complaint Portal - https://ocrportal.hhs.gov/ocr/portal/lobby.jsf     [place] Department of Human Rights (Formerly [facility])  In [place], you have the right to file a complaint with  the MDHR if you believe you have been discriminated against because of any of the following:      Race    Color    National Origin    Advent    Creed    Sex    Sexual Orientation    Marital Status    Public Assistance Status    Disability    Contact the MDHR directly to file a complaint:         [place] Department of Human Rights         [facility], [address] [place] [address]         [phone] (voice)          [phone] (toll free)         711 or [phone] ([place] Relay)         [phone] (Fax)         [email or web address] (Email)     [place] Department of Human Services (DHS)  You have the right to file a complaint with [facility] if you believe you have been discriminated against in our health care programs because of any of the following:    Race    Color    National Origin    Creed    Advent    Age    Public Assistance Status    Receipt of Health Care Services    Disability (including physical or mental impairment)    Sex (including sex stereotypes and gender identity)    Marital Status    Political Beliefs    Medical Condition    Genetic Information    Sexual Orientation    Claims Experience    Medical History    Health Status    Complaints must be in writing and filed within 180 days of the date you discovered the alleged discrimination. The complaint must contain your name and address and describe the discrimination you are complaining about. After we get your complaint, we will review it and notify you in writing about whether we have authority to investigate. If we do, we will investigate the complaint.      [facility] will notify you in writing of the investigations outcome. You have a right to appeal the outcome if you disagree with the decision. To appeal, you must send a written request to have [facility] review the investigation outcome period. Be brief and state why you disagree with the decision. Include additional information you think is important.      If you file a  complaint in this way, the people who work for the agency named in the complaint cannot retaliate against you. This means they cannot punish you in any way for filing a complaint. Filing a complaint in this way does not stop you from seeking out other legal or administration actions.     Contact DHS directly to file a discrimination complaint:        Civil Rights Coordinator        [facility]        Equal Opportunity and Access Division        [address] [address]        [place] [address] [phone] (voice) or use your preferred relay service     [facility] Complaint Notice   You have the right to file a complaint with [employer] if you believe you have been discriminated against because of any of the following:       Medical condition    Health status    Receipt of health care services    Claims experience    Medical history    Genetic information    Disability (including mental or physical impairment)    Marital status    Age    Sex (including sex stereotypes and gender identity)    Sexual orientation    National origin    Race    Color    Bahai    Creed    Public assistance status    Political beliefs    You can file a complaint and ask for help in filing a complaint in person or by mail, phone, fax, or email at:     [facility] Civil Rights Coordinator  [facility]  [address], [place] [address] [phone] (voice and fax) or TTS:837  Email: [email or web address]    American Indians can continue to use [place] [facility] and [facility] ([facility]) clinics. We will not require prior approval or impose any conditions for you to get services at these clinics. For elders age 65 years and older this includes Elderly Waiver (EW) services accessed through the [place]. If a doctor or other provider in a [place] or [facility]HS clinic refers you to a provider in our network, we will not require you to see your primary care provider prior to the referral.    For  accessible formats of this publication or assistance with equal or access to our services, visit [email or web address]/contactmedicaid, or call [phone] (toll free) or use your preferred relay service.

## 2021-06-21 NOTE — LETTER
Letter by Aftab Calyton MD at      Author: Aftab Clayton MD Service: -- Author Type: --    Filed:  Encounter Date: 10/26/2020 Status: (Other)         10/26/20          Felisha Brown  [address] [facility] [address]  [place] [address]    Dear Felisha,    As a valued [facility] patient, your healthcare needs are our priority. We are contacting you in regards to your upcoming appointment with Dr. Clayton on November 13th at 9:00 am. Our clinic records indicate we do not have any contact telephone numbers for you so we have to mail out a letter in regards to that appointment, Dr. Clayton will not be in the office that day, please call our office at [phone] 24/7 to get that rescheduled, as soon as possible.      Sincerely,  Jose Marcial

## 2021-06-21 NOTE — LETTER
Letter by Jose Mata RN at      Author: Jose Mata RN Service: -- Author Type: --    Filed:  Encounter Date: 12/11/2020 Status: (Other)       December 11, 2020    Important [employer] Information    FELISHA LANDON  [address]  [place] [address]  A Partner in Your Care  Dear Felisha,  Thank you for choosing [facility] for your health plan coverage! I am excited to welcome you as a member of [employer] .  My name is Esperanza Garcia RN, and I will be working with you as your Care Coordinator. [facility] [employer] partners with [employer] to provide members with Care Coordination services.  As your Care Coordinator, I can:    Work with you to create a Care Plan to keep you healthy and safe    Help you make appointments to see health care providers     Support you and your family in making health care decisions    Find community services that may interest you    Identify health benefits you are eligible for  What happens next?  To get started, I will call you. Ill ask you a few questions about your health and schedule a time to meet. You will have a chance to ask me questions, too.  Questions?  Call me at [phone] Monday-Friday between 8am and 5pm. TTY/TDD: 711. I look forward to speaking with you soon.  Sincerely,      Jose Mata RN    E-mail: [email or web address]  Phone: [phone]      [employer]          cc: member records                                Civil Rights Notice  Discrimination is against the law. [employer] does not discriminate on the basis of any of the following:    Race    Color    National Origin    Creed    Protestant    Age    Public Assistance Status    Receipt of Health Care Services    Disability (including physical or mental impairment)    Sex (including sex stereotypes and gender identity)    Marital Status    Political Beliefs    Medical Condition    Genetic Information    Sexual Orientation    Claims Experience    Medical History    Health  Status    Auxiliary Aids and Services:  [employer] provides auxiliary aids and services, like qualified interpreters or information in accessible formats, free of charge and in a timely manner, to ensure an equal opportunity to participate in our health care programs. Contact [employer] Customer Service at [employer]/contact medicaid or call [phone] (toll free); TTY:711 or at [employer]/contactmedicaid.    Language Assistance Services:  [employer] provides translated documents and spoken language interpreting, free of charge and in a timely manner, when language assistance services are necessary to ensure limited English speakers have meaningful access to our information and services. Contact [employer] at [phone] (toll free); TTY: 716 or [employer]/contactBaynoteid.     Civil Rights Complaints  You have the right to file a discrimination complaint if you believe you were treated in a discriminatory way by [employer]. You may contact any of the following four agencies directly to file a discrimination complaint.    U.S. Department of Health and Human Services Office for Civil Rights (OCR)  You have the right to file a complaint with the OCR, a federal agency, if you believe you have been discriminated against because of any of the following:    Race    Disability    Color    Sex    National Origin    Age      Contact the OCR directly to file a complaint:         Director         U.S. Department of Health and Human Services Office for Civil Rights         [address] [place] [address]         [phone] (Voice)         [phone] (TDD)         Complaint Portal - https://ocrportal.hhs.gov/ocr/portal/lobby.jsf     [place] Department of Human Rights (MDHR)  In [place], you have the right to file a complaint with the [facility] if you believe you have been discriminated against because of any of the following:      Race    Color    National  Origin    Gnosticism    Creed    Sex    Sexual Orientation    Marital Status    Public Assistance Status    Disability    Contact the MDHR directly to file a complaint:         [place] Department of Human Rights         [facility], [address] [place] [address]         [phone] (voice)          [phone] (toll free)         711 or [phone] ([place] Relay)         [phone] (Fax)         [email or web address]. (Email)     [place] Department of Human Services (DHS)  You have the right to file a complaint with [facility] if you believe you have been discriminated against in our health care programs because of any of the following:    Race    Color    National Origin    Creed    Gnosticism    Age    Public Assistance Status    Receipt of Health Care Services    Disability (including physical or mental impairment)    Sex (including sex stereotypes and gender identity)    Marital Status    Political Beliefs    Medical Condition    Genetic Information    Sexual Orientation    Claims Experience    Medical History    Health Status    Complaints must be in writing and filed within 180 days of the date you discovered the alleged discrimination. The complaint must contain your name and address and describe the discrimination you are complaining about. After we get your complaint, we will review it and notify you in writing about whether we have authority to investigate. If we do, we will investigate the complaint.      [facility] will notify you in writing of the investigations outcome. You have a right to appeal the outcome if you disagree with the decision. To appeal, you must send a written request to have [facility] review the investigation outcome period. Be brief and state why you disagree with the decision. Include additional information you think is important.      If you file a complaint in this way, the people who work for the agency named in the complaint cannot retaliate against you. This means they  cannot punish you in any way for filing a complaint. Filing a complaint in this way does not stop you from seeking out other legal or administration actions.     Contact [facility] directly to file a discrimination complaint:        Civil Rights Coordinator        [place] Department of Human Services        Equal Opportunity and Access Division        [address] [address]        [place] [address] [phone] (voice) or use your preferred relay service     [facility] Complaint Notice   You have the right to file a complaint with [employer] if you believe you have been discriminated against because of any of the following:       Medical condition    Health status    Receipt of health care services    Claims experience    Medical history    Genetic information    Disability (including mental or physical impairment)    Marital status    Age    Sex (including sex stereotypes and gender identity)    Sexual orientation    National origin    Race    Color    Cheondoism    Creed    Public assistance status    Political beliefs    You can file a complaint and ask for help in filing a complaint in person or by mail, phone, fax, or email at:     [facility] Civil Rights Coordinator  [facility] [facility]  [address], [place] [address] [phone] (voice and fax) or TTY:514  Email: [email or web address]    American Indians can continue to use [place] and [facility] ([facility]) clinics. We will not require prior approval or impose any conditions for you to get services at these clinics. For elders age 65 years and older this includes Elderly Waiver (EW) services accessed through the [place]. If a doctor or other provider in a [place] or [facility] clinic refers you to a provider in our network, we will not require you to see your primary care provider prior to the referral.    For accessible formats of this publication or assistance with equal or access to our services, visit [employer]/contactmedicaid, or  call [phone] (toll free) or use your preferred relay service.

## 2021-07-03 NOTE — ADDENDUM NOTE
Addendum Note by Silva Hitchcock MLT at 11/24/2020  9:20 AM     Author: Silva Hitchcock MLT Service: -- Author Type:     Filed: 11/25/2020  8:59 AM Encounter Date: 11/24/2020 Status: Signed    : Silva Hitchcock MLT ()    Addended by: SILVA HITCHCOCK on: 11/25/2020 08:59 AM        Modules accepted: Orders

## 2021-07-13 ENCOUNTER — RECORDS - HEALTHEAST (OUTPATIENT)
Dept: ADMINISTRATIVE | Facility: CLINIC | Age: 76
End: 2021-07-13

## 2021-07-19 ENCOUNTER — PATIENT OUTREACH (OUTPATIENT)
Dept: GERIATRIC MEDICINE | Facility: CLINIC | Age: 76
End: 2021-07-19

## 2021-07-19 NOTE — PROGRESS NOTES
[facility] Care Coordination Contact      [facility] Six-Month Telephone Assessment    6 month telephone assessment completed on 7/19/2021: [facility]    7/12/2021  Called Felisha ([phone]) to complete 6 month telephone assessment. No answer, left vm message requesting for a call back.  Will try again later.    7/14/2021  Called Felisha ([phone]) to complete 6 month telephone assessment. No answer, left vm message requesting for a call back.  Will try again later.    7/19/2021  Called Felisha ([phone]) to complete 6 month telephone assessment. No answer, left vm message requesting for a call back.  Will try again later.  Request for CMS to mail out the Unable to Reach letter to Felisha today.   Completed 4 attempts to reach client with no response.  Member is officially unable to contact effective today.    Follow-up Plan:  Will see member in 6 months for an annual health risk assessment.     Jose Mata RN, PHN  [facility] Care Coordinator  Phone: [phone]  Fax [phone]   [email or web address]

## 2021-07-19 NOTE — LETTER
July 21, 2021    Important [facility] Information    FELISHA MARTINEZ BARBI  [address]   [facility] [address]  I've Tried to Contact You  Dear Felisha,  My name is Jose Mata RN, and I am your Care Coordinator. I have been trying to contact you, but have not been able to reach you.  As a Care Coordinator, I am here to help. My role is to make sure that your health plan is working for you. I am available to:     Review your health care needs with you over the phone or in-person     Provide support for and information about covered services or supplies to help keep you safe and healthy in your home    Answer questions about your insurance     Help you find a provider, such as a doctor or dentist, to meet your unique needs  I can also help you schedule a free physical at your clinic. To schedule an appointment, please call me at [phone] Monday-Friday between 8am-5pm TTY: [phone].    Questions?  Please call me at the phone number listed above. If you d like, a friend or family member may call for you.  For general questions, call [facility] Customer Service at [phone] or [phone] (toll free) from 8 a.m. - 8 p.m. Central, seven days a week. Access to representatives may be limited at times. TTY: [phone].  Sincerely,    Jose Mata RN    E-mail: [email or web address]  Phone: [phone]      [facility]    cc: member records                                                                                            CB5 ([facility]) (5-2020)    Civil Rights Notice  Discrimination is against the law. [facility] does not discriminate on the basis of any of the following:    Race    Color    National Origin    Creed    Pentecostalism    Age    Public Assistance Status    Receipt of Health Care Services    Disability (including physical or mental impairment)    Sex (including sex stereotypes and gender identity)    Marital Status    Political Beliefs    Medical Condition    Genetic Information    Sexual  Orientation    Claims Experience    Medical History    Health Status    Auxiliary Aids and Services:  [facility] provides auxiliary aids and services, like qualified interpreters or information in accessible formats, free of charge and in a timely manner, to ensure an equal opportunity to participate in our health care programs. Contact [facility] at [facility]/contact medicaid or call [phone] (toll free); TTY:713 or at [facility]/contactBVfon Telecommunicationcaid.    Language Assistance Services:  [employer] provides translated documents and spoken language interpreting, free of charge and in a timely manner, when language assistance services are necessary to ensure limited English speakers have meaningful access to our information and services. Contact [employer] at [phone] (toll free); TTY: 492 or [facility]/contactmedicaid.     Civil Rights Complaints  You have the right to file a discrimination complaint if you believe you were treated in a discriminatory way by [facility]. You may contact any of the following four agencies directly to file a discrimination complaint.        U.S. Department of Health and Human Services  Office for Civil Rights (OCR)  You have the right to file a complaint with the OCR, a federal agency, if you believe you have been discriminated against because of any of the following:    Race    Disability    Color    Sex    National Origin    Age    Pentecostalism (in some cases)    Contact the OCR directly to file a complaint:         Director         U.S. Department of Health and Human Services  Office for Civil Rights         [address] [place] [address]         Customer Response Center: Toll-free: [phone]          TDD: [phone]         Email: [email or web address]    [place] Department of Human Rights (MDHR)  In [place], you have the right to file a complaint with the [facility] if you believe you have been discriminated against because of any of the  following:      Race    Color    National Origin    Faith    Creed    Sex    Sexual Orientation    Marital Status    Public Assistance Status    Disability    Contact the MD directly to file a complaint:         [facility] of Human Rights         [address] [address]         [phone] (voice)          [phone] (toll free)         711 or [phone] ([place] Relay)         [phone] (Fax)         [email or web address] (Email)     [place] Department of Human Services (DHS)  You have the right to file a complaint with [facility] if you believe you have been discriminated against in our health care programs because of any of the following:    Race    Color    National Origin    Creed    Faith    Age    Public Assistance Status    Receipt of Health Care Services    Disability (including physical or mental impairment)    Sex (including sex stereotypes and gender identity)    Marital Status    Political Beliefs    Medical Condition    Genetic Information    Sexual Orientation    Claims Experience    Medical History    Health Status    Complaints must be in writing and filed within 180 days of the date you discovered the alleged discrimination. The complaint must contain your name and address and describe the discrimination you are complaining about. After we get your complaint, we will review it and notify you in writing about whether we have authority to investigate. If we do, we will investigate the complaint.      [facility] will notify you in writing of the investigation s outcome. You have a right to appeal the outcome if you disagree with the decision. To appeal, you must send a written request to have [facility] review the investigation outcome. Be brief and state why you disagree with the decision. Include additional information you think is important.      If you file a complaint in this way, the people who work for the agency named in the complaint cannot  retaliate against you. This means they cannot punish you in any way for filing a complaint. Filing a complaint in this way does not stop you from seeking out other legal or administration actions.     Contact [facility] directly to file a discrimination complaint:        Civil Rights Coordinator        [facility] of Human Services        Equal Opportunity and Access Division        [address] [address]        [place] [address] [phone] (voice) or use your preferred relay service     [facility] Complaint Notice   You have the right to file a complaint with [employer] if you believe you have been discriminated against because of any of the following:       Medical condition    Health status    Receipt of health care services    Claims experience    Medical history    Genetic information    Disability (including mental or physical impairment)    Marital status    Age    Sex (including sex stereotypes and gender identity)    Sexual orientation    National origin    Race    Color    Buddhism    Creed    Public assistance status    Political beliefs    You can file a complaint and ask for help in filing a complaint in person or by mail, phone, fax, or email at:     [facility] Civil Rights Coordinator  [facility]  [address], [place] [address] [phone] (voice and fax) or TTY:264  Email: [email or web address]    American Indians can begin or continue to use [facility] and  Health Services (IHS) clinics. We will not require prior approval or impose any conditions for you to get services at these clinics. For elders age 65 years and older this includes Elderly Waiver (EW) services accessed through the [place]. If a doctor or other provider in a [facility] or IHS clinic refers you to a provider in our network, we will not require you to see your primary care provider prior to the referral.

## 2021-07-21 NOTE — PROGRESS NOTES
"[facility] Care Coordination Contact    Per CC, mailed client an \"Unable to Contact\" letter.    Stephanie Stewart  Care Management Specialist   [facility]   [phone]    "

## 2021-07-22 NOTE — PROGRESS NOTES
[facility] Care Coordination Contact    Member became effective with  [facility] on 12/1/20 with [facility].  Previous Health Plan: [employer] [facility]  Previous Care System: [facility]  Previous care coordinators name and number: Ijeoma Lawrence Type: N/A  Last MMIS Entry: Date 11-2-20 and Type 07 ADMIN ACT  MMIS visit date (and type) if different from above: No visits on MMIS  Services Listed in MMIS:   N/A  UTF received: -No Per UMP:  member completed Self-Report Health Questionnaire this month, reporting her primary clinic is:  [place] [address]    She doesn t mention an MD and no phone for herself and doesn t want any services at this time.    This is the first response we have received from this member ever.  - Khloe Obrien  Managed Care Specialist [facility] MSC+ Managed Care  [facility]  [facility]  [email or web address]  Office: [phone] Fax: [phone]       No transfer docs to request.     WL sent.    Stephanie Stewart  Care Management Specialist   [facility]   [phone]

## 2021-08-06 NOTE — PATIENT INSTRUCTIONS - HE
Patient Instructions by Aftab Clayton MD at 11/11/2019  1:00 PM     Author: Aftab Clayton MD Service: -- Author Type: Physician    Filed: 11/11/2019  2:10 PM Encounter Date: 11/11/2019 Status: Addendum    : Aftab Clayton MD (Physician)    Related Notes: Original Note by Aftab Clayton MD (Physician) filed at 11/11/2019  2:08 PM       You are also due for a shingles and tetanus (TDaP) vaccine. In order to be covered by insurance, you need to go to the pharmacy to get it done. When you do, please have them fax/send the information of the vaccination to us.     Patient Education   Alcohol Use   Many people can enjoy a glass of wine or beer without any negative consequences to their health. According to the Centers for Disease Control and Prevention (CDC), having one or fewer drinks per day for women and two or fewer per day for men is considered moderate drinking.     When people drink more than moderately, it can become concerning. Excessive drinking is defined as consuming 15 drinks or more per week for men and 8 drinks or more per week for women. There are various health problems associated with excessive drinking, which include:    Damage to vital organs like the heart, brain, liver and pancreas    Harm to the digestive tract    Weaken the immune system    Higher risk for heart disease and cancer       Patient Education   Understanding USDA MyPlate  The USDA (US Department of Agriculture) has guidelines to help you make healthy food choices. These are called MyPlate. MyPlate shows the food groups that make up healthy meals using the image of a place setting. Before you eat, think about the healthiest choices for what to put onto your plate or into your cup or bowl. To learn more about building a healthy plate, visit www.choosemyplate.gov.       The Food Groups    Fruits: Any fruit or 100% fruit juice counts as part of the Fruit Group. Fruits may be fresh,  canned, frozen, or dried, and may be whole, cut-up, or pureed. Make half your plate fruits and vegetables.    Vegetables: Any vegetable or 100% vegetable juice counts as a member of the Vegetable Group. Vegetables may be fresh, frozen, canned, or dried. They can be served raw or cooked and may be whole, cut-up, or mashed. Make half your plate fruits and vegetables.     Grains: All foods made from grains are part of the Grains Group. These include wheat, rice, oats, cornmeal, and barley such as bread, pasta, oatmeal, cereal, tortillas, and grits. Grains should be no more than a quarter of your plate. At least half of your grains should be whole grains.    Protein: This group includes meat, poultry, seafood, beans and peas, eggs, processed soy products (like tofu), nuts (including nut butters), and seeds. Make protein choices no more than a quarter of your plate. Meat and poultry choices should be lean or low fat.    Dairy: All fluid milk products and foods made from milk that contain calcium, like yogurt and cheese are part of the Dairy Group. (Foods that have little calcium, such as cream, butter, and cream cheese, are not part of the group.) Most dairy choices should be low-fat or fat-free.    Oils: These are fats that are liquid at room temperature. They include canola, corn, olive, soybean, and sunflower oil. Foods that are mainly oil include mayonnaise, certain salad dressings, and soft margarines. You should have only 5 to 7 teaspoons of oils a day. You probably already get this much from the food you eat.  Use [employer] to Help Build Your Meals  The [employer] can help you plan and track your meals and activity. You can look up individual foods to see or compare their nutritional value. You can get guidelines for what and how much you should eat. You can compare your food choices. And you can assess personal physical activities and see ways you can improve. Go to www.choosemyplate.gov/M[employer] Solutionscker/.     3556-7941 The [employer]. [address] [place] [address]. All rights reserved. This information is not intended as a substitute for professional medical care. Always follow your healthcare professional's instructions.           Patient Education   Urinary Incontinence, Female (Adult)  Urinary incontinence means loss of control of the bladder. This problem affects many women, especially as they get older. If you have incontinence, you may be embarrassed to ask for help. But know that this problem can be treated.  Types of Incontinence  There are different types of incontinence. Two of the main types are described here. You can have more than one type.    Stress incontinence. With this type, urine leaks when pressure (stress) is put on the bladder. This may happen when you cough, sneeze, or laugh. Stress incontinence most often occurs because the pelvic floor muscles that support the bladder and urethra are weak. This can happen after pregnancy and vaginal childbirth or a hysterectomy. It can also be due to excess body weight or hormone changes.    Urge incontinence (also called overactive bladder). With this type, a sudden urge to urinate is felt often. This may happen even though there may not be much urine in the bladder. The need to urinate often during the night is common. Urge incontinence most often occurs because of bladder spasms. This may be due to bladder irritation or infection. Damage to bladder nerves or pelvic muscles, constipation, and certain medicines can also lead to urge incontinence.  Treatment of urinary incontinence depends on the cause. Further evaluation is needed to find the type you have. This will likely include an exam and certain tests. Based on the results, you and your healthcare provider can then plan treatment. Until a diagnosis is made, the home care tips below can help relieve symptoms.  Home care    Do pelvic floor muscle exercises, if they are prescribed. The  pelvic floor muscles help support the bladder and urethra. Many women find that their symptoms improve when doing special exercises that strengthen these muscles. To do the exercises contract the muscles you would use to stop your stream of urine, but do this when youre not urinating. Hold for 10 seconds, then relax. Repeat 10 to 20 times in a row, at least 3 times a day. Your provider may give you other instructions for how to do the exercises and how often.    Keep a bladder diary. This helps track how often and how much you urinate over a set period of time. Bring this diary with you to your next visit with the provider. The information can help your provider learn more about your bladder problem.    Lose weight, if advised to by your provider. Excess weight puts pressure on the bladder. Your provider can help you create a weight-loss plan thats right for you. This may include exercising more and making certain diet changes.    Don't consume foods and drinks that may irritate the bladder. These can include alcohol and caffeinated drinks.    Quit smoking. Smoking and other tobacco use can lead to chronic cough that strains the pelvic floor muscles. Smoking may also damage the bladder and urethra. Talk with your provider about treatments or methods you can use to quit smoking.    If drinking large amounts of fluid causes you to have symptoms, you may be advised to limit your fluid intake. You may also be advised to drink most of your fluids during the day and to limit fluids at night.    If youre worried about urine leakage or accidents, you may wear absorbent pads to catch urine. Change the pads often. This helps reduce discomfort. It may also reduce the risk of skin or bladder infections.  Follow-up care  Follow up with your healthcare provider, or as directed. It may take some to find the right treatment for your problem. Your treatment plan may include special therapies or medicines. Certain procedures or  surgery may also be options. Be sure to discuss any questions you have with your provider.  When to seek medical advice  Call the healthcare provider right away if any of these occur:    Fever of 100.4 F (38 C) or higher, or as directed by your provider    Bladder pain or fullness    Abdominal swelling    Nausea or vomiting    Back pain    Weakness, dizziness or fainting  Date Last Reviewed: 10/1/2017    2590-5204 The [employer]. [address] [place] [address], [place] [id number]. All rights reserved. This information is not intended as a substitute for professional medical care. Always follow your healthcare professional's instructions.     Patient Education   Your Health Risk Assessment indicates you feel you are not in good emotional health.    Recreation   Recreation is not limited to sports and team events. It includes any activity that provides relaxation, interest, enjoyment, and exercise. Recreation provides an outlet for physical, mental, and social energy. It can give a sense of worth and achievement. It can help you stay healthy.    Mental Exercise and Social Involvement  Mental and emotional health is as important as physical health. Keep in touch with friends and family. Stay as active as possible. Continue to learn and challenge yourself.   Things you can do to stay mentally active are:    Learn something new, like a foreign language or musical instrument.     Play SCRABBLE or do crossword puzzles. If you cannot find people to play these games with you at home, you can play them with others on your computer through the Internet.     Join a games club--anything from card games to chess or checkers or lawn bowling.     Start a new hobby.     Go back to school.     Volunteer.     Read.     Keep up with world events.       Patient Education   Understanding Advance Care Planning  Advance care planning is the process of deciding ones own future medical care. It helps ensure that if you cant speak for  yourself, your wishes can still be carried out. The plan is a series of legal documents that note a persons wishes. The documents vary by state. Advance care planning may be done when a person has a serious illness that is expected to get worse. It may be done before major surgery. And it can help you and your family be prepared in case of a major illness or injury. Advance care planning helps with making decisions at these times.       A health care proxy is a person who acts as the voice of a patient when the patient cant speak for himself or herself. The name of this role varies by state. It may be called a Durable Medical Power of  or Durable Power of  for Healthcare. It may be called an agent, surrogate, or advocate. Or it may be called a representative or decision maker. It is an official duty that is identified by a legal document. The document also varies by state.    Why Is Advance Care Planning Important?  If a person communicates their healthcare wishes:    They will be given medical care that matches their values and goals.    Their family members will not be forced to make decisions in a crisis with no guidance.  Creating a Plan  Making an advance care plan is often done in 3 steps:    Thinking about ones wishes. To create an advance care plan, you should think about what kind of medical treatment you would want if you lose the ability to communicate. Are there any situations in which you would refuse or stop treatment? Are there therapies you would want or not want? And whom do you want to make decisions for you? There are many places to learn more about how to plan for your care. Ask your doctor or  for resources.    Picking a health care proxy. This means choosing a trusted person to speak for you only when you cant speak for yourself. When you cannot make medical decisions, your proxy makes sure the instructions in your advance care plan are followed. A proxy does not  make decisions based on his or her own opinions. They must put aside those opinions and values if needed, and carry out your wishes.    Filling out the legal documents. There are several kinds of legal documents for advance care planning. Each one tells health care providers your wishes. The documents may vary by state. They must be signed and may need to be witnessed or notarized. You can cancel or change them whenever you wish. Depending on your state, the documents may include a Healthcare Proxy form, Living Will, Durable Medical Power of , Advance Directive, or others.  The Familys Role  The best help a family can give is to support their loved ones wishes. Open and honest communication is vital. Family should express any concerns they have about the patients choices while the patient can still make decisions.    1249-3334 The [employer]. [address] [place] [address]. All rights reserved. This information is not intended as a substitute for professional medical care. Always follow your healthcare professional's instructions.         Also, [facility] offers a free, downloadable health care directive that allows you to share your treatment choices and personal preferences if you cannot communicate your wishes. It also allows you to appoint another person (called a health care agent) to make health care decisions if you are unable to do so. You can download an advance directive by going here: [email or web address]     Patient Education   Personalized Prevention Plan  You are due for the preventive services outlined below.  Your care team is available to assist you in scheduling these services.  If you have already completed any of these items, please share that information with your care team to update in your medical record.  Health Maintenance   Topic Date Due   ? DEPRESSION FOLLOW UP  1945   ? HEPATITIS C SCREENING  1945   ? TD  18+ HE  07/26/1964   ? ADVANCE CARE PLANNING  07/26/1964   ? COLONOSCOPY  07/26/1996   ? ZOSTER VACCINES (1 of 2) 07/26/1996   ? MEDICARE ANNUAL WELLNESS VISIT  07/26/2011   ? DXA SCAN  07/26/2011   ? MAMMOGRAM  09/24/2012   ? INFLUENZA VACCINE RULE BASED (1) 08/01/2019   ? FALL RISK ASSESSMENT  11/11/2020   ? PNEUMOCOCCAL IMMUNIZATION 65+ LOW/MEDIUM RISK  Completed

## 2021-10-20 ENCOUNTER — PATIENT OUTREACH (OUTPATIENT)
Dept: GERIATRIC MEDICINE | Facility: CLINIC | Age: 76
End: 2021-10-20
Payer: COMMERCIAL

## 2021-10-20 NOTE — LETTER
November 15, 2021    Important [employer] Information    FELISHA MARTINEZ BARBI  [address]   [facility] [address]  I've Tried to Contact You  Dear Felisah,  My name is Jose Mata RN, and I am your Care Coordinator. I have been trying to contact you, but have not been able to reach you.  As a Care Coordinator, I am here to help. My role is to make sure that your health plan is working for you. I am available to:     Review your health care needs with you over the phone or in-person     Provide support for and information about covered services or supplies to help keep you safe and healthy in your home    Answer questions about your insurance     Help you find a provider, such as a doctor or dentist, to meet your unique needs  I can also help you schedule a free physical at your clinic. To schedule an appointment, please call me at [phone] Monday-Friday between 8am-5pm TTY: 711.    Questions?  Please call me at the phone number listed above. If you d like, a friend or family member may call for you.  For general questions, call [employer] Customer Service at [phone] or [phone] (toll free) from 8 a.m. - 8 p.m. Central, seven days a week. Access to representatives may be limited at times. TTY: 711.  Sincerely,    Jose Mata RN    E-mail: [email or web address]  Phone: [phone]      [facility] [place]    cc: member records                                                                                            CB5 ([facility]) (5-2020)    Civil Rights Notice  Discrimination is against the law. [employer] does not discriminate on the basis of any of the following:    Race    Color    National Origin    Creed    Jainism    Age    Public Assistance Status    Receipt of Health Care Services    Disability (including physical or mental impairment)    Sex (including sex stereotypes and gender identity)    Marital Status    Political Beliefs    Medical Condition    Genetic  Information    Sexual Orientation    Claims Experience    Medical History    Health Status    Auxiliary Aids and Services:  [employer] provides auxiliary aids and services, like qualified interpreters or information in accessible formats, free of charge and in a timely manner, to ensure an equal opportunity to participate in our health care programs. Contact [employer] at [email or web address] medicaid or call [phone] (toll free); TTY:711 or at [email or web address].    Language Assistance Services:  [employer] provides translated documents and spoken language interpreting, free of charge and in a timely manner, when language assistance services are necessary to ensure limited English speakers have meaningful access to our information and services. Contact [employer] at [phone] (toll free); TTY: 199 or [email or web address].     Civil Rights Complaints  You have the right to file a discrimination complaint if you believe you were treated in a discriminatory way by [facility]. You may contact any of the following four agencies directly to file a discrimination complaint.        U.S. Department of Health and Human Services  Office for Civil Rights (OCR)  You have the right to file a complaint with the OCR, a federal agency, if you believe you have been discriminated against because of any of the following:    Race    Disability    Color    Sex    National Origin    Age    Adventist (in some cases)    Contact the OCR directly to file a complaint:         Director         U.S. Department of Health and Human Services  Office for Civil Rights         [address] [place] [address]         Customer Response Center: Toll-free: [phone]          TDD: [phone]         Email: [email or web address]    [place] Department of Human Rights (MDHR)  In [place], you have the right to file a complaint with the MD if you believe you have been discriminated against  because of any of the following:      Race    Color    National Origin    Confucianist    Creed    Sex    Sexual Orientation    Marital Status    Public Assistance Status    Disability    Contact the MD directly to file a complaint:         [facility] of Human Rights         [address] [place] [address]         [phone] (voice)          [phone] (toll free)         711 or [phone] ([place] Relay)         [phone] (Fax)         [email or web address]. (Email)     [place] Department of Human Services (DHS)  You have the right to file a complaint with [facility] if you believe you have been discriminated against in our health care programs because of any of the following:    Race    Color    National Origin    Creed    Confucianist    Age    Public Assistance Status    Receipt of Health Care Services    Disability (including physical or mental impairment)    Sex (including sex stereotypes and gender identity)    Marital Status    Political Beliefs    Medical Condition    Genetic Information    Sexual Orientation    Claims Experience    Medical History    Health Status    Complaints must be in writing and filed within 180 days of the date you discovered the alleged discrimination. The complaint must contain your name and address and describe the discrimination you are complaining about. After we get your complaint, we will review it and notify you in writing about whether we have authority to investigate. If we do, we will investigate the complaint.      [facility] will notify you in writing of the investigation s outcome. You have a right to appeal the outcome if you disagree with the decision. To appeal, you must send a written request to have [facility] review the investigation outcome. Be brief and state why you disagree with the decision. Include additional information you think is important.      If you file a complaint in this way, the people who work for the agency named in  the complaint cannot retaliate against you. This means they cannot punish you in any way for filing a complaint. Filing a complaint in this way does not stop you from seeking out other legal or administration actions.     Contact DHS directly to file a discrimination complaint:        Civil Rights Coordinator        [facility] of Human Services        Equal Opportunity and Access Division        [address] [address]        [place] [address] [phone] (voice) or use your preferred relay service     [employer] Complaint Notice   You have the right to file a complaint with [employer] if you believe you have been discriminated against because of any of the following:       Medical condition    Health status    Receipt of health care services    Claims experience    Medical history    Genetic information    Disability (including mental or physical impairment)    Marital status    Age    Sex (including sex stereotypes and gender identity)    Sexual orientation    National origin    Race    Color    Yazdanism    Creed    Public assistance status    Political beliefs    You can file a complaint and ask for help in filing a complaint in person or by mail, phone, fax, or email at:     [employer] Civil Rights Coordinator  [facility]  [address], [place] [address] [phone] (voice and fax) or TTY:432  Email: [email or web address]    American Indians can begin or continue to use [facility] and  Health Services (IHS) clinics. We will not require prior approval or impose any conditions for you to get services at these clinics. For elders age 65 years and older this includes Elderly Waiver (EW) services accessed through the [place]. If a doctor or other provider in a [facility] or [facility] clinic refers you to a provider in our network, we will not require you to see your primary care provider prior to the referral.

## 2021-10-20 NOTE — PROGRESS NOTES
[facility] Care Coordination Contact    Called member  [phone]) to schedule annual HRA home visit. Left a message requesting a return call to schedule HRA.  CC also tried to call Arcelia () with [facility]: [phone] to confirm Felisha's current phone number. There was no answer, left a message requesting a return call.      Jose Mata RN, PHN  [facility] Care Coordinator  Phone: [phone]  Fax [phone]   [email or web address]

## 2021-10-25 NOTE — PROGRESS NOTES
[facility] Care Coordination Contact    Called member  [phone]) to schedule annual HRA home visit. Left a message requesting a return call to schedule HRA.   CC received a call back from  to  Arcelia () with [facility]: [phone] who informed CC that she discharged Felisha at her request a few months ago and is not able to open her chart.     Jose Mata RN, PHN  [facility] Care Coordinator  Phone: [phone]  Fax [phone]   [email or web address]

## 2021-11-09 NOTE — PROGRESS NOTES
[facility] Care Coordination Contact    CC placed third phone call to member to schedule annual HRA home visit. Left a message requesting a return call to schedule HRA.     Jose Mata RN, PHN  [facility] Care Coordinator  Phone: [phone]  Fax [phone]   [email or web address]

## 2021-11-15 NOTE — PROGRESS NOTES
"[facility] Care Coordination Contact    Per CC, mailed client an \"Unable to Contact\" letter.    Stephanie Stewart  Care Management Specialist   [facility]   [phone]    "

## 2022-06-02 ENCOUNTER — PATIENT OUTREACH (OUTPATIENT)
Dept: GERIATRIC MEDICINE | Facility: CLINIC | Age: 77
End: 2022-06-02
Payer: COMMERCIAL

## 2022-06-02 NOTE — LETTER
June 6, 2022    Important [employer] Information    FELISHA MARTINEZ LANDON  [address] [place] [address]  I've Tried to Contact You  Dear Felisha,  My name is Jose Mata RN, and I am your Care Coordinator. I have been trying to contact you, but have not been able to reach you.  As a Care Coordinator, I am here to help. My role is to make sure that your health plan is working for you. I am available to:     Review your health care needs with you over the phone or in-person     Provide support for and information about covered services or supplies to help keep you safe and healthy in your home    Answer questions about your insurance     Help you find a provider, such as a doctor or dentist, to meet your unique needs  I can also help you schedule a free physical at your clinic. To schedule an appointment, please call me at [phone] Monday-Friday between 8am-5pm TTY: 711.  I have included a copy of a Health Risk Assessment. Please fill it out and return it in the included envelope I have also included a document that provides you with more information about my role as your Care Coordinator and how I can help with your medical, social and everyday needs.     Questions?  Please call me at the phone number listed above. If you d like, a friend or family member may call for you.  For general questions, call [employer] Customer Service at [phone] or [phone] (toll free) from 8 a.m. - 8 p.m. Central, seven days a week. Access to representatives may be limited at times. TTY: 716.  Sincerely,    Jose Mata RN    E-mail: [email or web address]  Phone: [phone]      [employer]    cc: member records                                                                                            CB5 ([place]) (5-2020)    Civil Rights Notice  Discrimination is against the law. [employer] does not discriminate on the basis of any of the following:    Race    Color    National  Origin    Creed    Denominational    Age    Public Assistance Status    Receipt of Health Care Services    Disability (including physical or mental impairment)    Sex (including sex stereotypes and gender identity)    Marital Status    Political Beliefs    Medical Condition    Genetic Information    Sexual Orientation    Claims Experience    Medical History    Health Status    Auxiliary Aids and Services:  [employer] provides auxiliary aids and services, like qualified interpreters or information in accessible formats, free of charge and in a timely manner, to ensure an equal opportunity to participate in our health care programs. Contact [employer] at [employer]/contact medicaid or call [phone] (toll free); TTY:719 or at [email or web address].    Language Assistance Services:  [employer] provides translated documents and spoken language interpreting, free of charge and in a timely manner, when language assistance services are necessary to ensure limited English speakers have meaningful access to our information and services. Contact [employer] at [phone] (toll free); TTY: 710 or [email or web address].     Civil Rights Complaints  You have the right to file a discrimination complaint if you believe you were treated in a discriminatory way by [facility]. You may contact any of the following four agencies directly to file a discrimination complaint.        U.S. Department of Health and Human Services  Office for Civil Rights (OCR)  You have the right to file a complaint with the OCR, a federal agency, if you believe you have been discriminated against because of any of the following:    Race    Disability    Color    Sex    National Origin    Age    Denominational (in some cases)    Contact the OCR directly to file a complaint:         Director         U.S. Department of Health and Human Services  Office for Civil Rights         [address] [place] [address]          Customer Response Center: Toll-free: [phone]          TDD: [phone]         Email: [email or web address]    [place] Department of Human Rights (MDHR)  In [place], you have the right to file a complaint with the MDHR if you believe you have been discriminated against because of any of the following:      Race    Color    National Origin    Presybeterian    Creed    Sex    Sexual Orientation    Marital Status    Public Assistance Status    Disability    Contact the MDHR directly to file a complaint:         [place] Department of Human Rights         [address] [place] [address]         [phone] (voice)          [phone] (toll free)         714 or [phone] ([place] Relay)         [phone] (Fax)         [email or web address]. (Email)     [place] Department of Human Services (DHS)  You have the right to file a complaint with [facility] if you believe you have been discriminated against in our health care programs because of any of the following:    Race    Color    National Origin    Creed    Presybeterian    Age    Public Assistance Status    Receipt of Health Care Services    Disability (including physical or mental impairment)    Sex (including sex stereotypes and gender identity)    Marital Status    Political Beliefs    Medical Condition    Genetic Information    Sexual Orientation    Claims Experience    Medical History    Health Status    Complaints must be in writing and filed within 180 days of the date you discovered the alleged discrimination. The complaint must contain your name and address and describe the discrimination you are complaining about. After we get your complaint, we will review it and notify you in writing about whether we have authority to investigate. If we do, we will investigate the complaint.      [facility] will notify you in writing of the investigation s outcome. You have a right to appeal the outcome if you disagree with the decision. To  appeal, you must send a written request to have [facility] review the investigation outcome. Be brief and state why you disagree with the decision. Include additional information you think is important.      If you file a complaint in this way, the people who work for the agency named in the complaint cannot retaliate against you. This means they cannot punish you in any way for filing a complaint. Filing a complaint in this way does not stop you from seeking out other legal or administration actions.     Contact [facility] directly to file a discrimination complaint:        Civil Rights Coordinator        [facility] of Human Services        Equal Opportunity and Access Division        [address] [address]        [place] [address] [phone] (voice) or use your preferred relay service     [employer] Complaint Notice   You have the right to file a complaint with [employer] if you believe you have been discriminated against because of any of the following:       Medical condition    Health status    Receipt of health care services    Claims experience    Medical history    Genetic information    Disability (including mental or physical impairment)    Marital status    Age    Sex (including sex stereotypes and gender identity)    Sexual orientation    National origin    Race    Color    Taoism    Creed    Public assistance status    Political beliefs    You can file a complaint and ask for help in filing a complaint in person or by mail, phone, fax, or email at:     [facility] Civil Rights Coordinator  [facility] [employer]  [address], [place] [address] [phone] (voice and fax) or KOG:751  Email: [email or web address]    American Indians can begin or continue to use [facility] and  Health Services (S) clinics. We will not require prior approval or impose any conditions for you to get services at these clinics. For elders age 65 years and older this includes Elderly Waiver (EW)  services accessed through the [place]. If a doctor or other provider in a [facility] or [facility] clinic refers you to a provider in our network, we will not require you to see your primary care provider prior to the referral.

## 2022-06-03 NOTE — PROGRESS NOTES
[facility] Care Coordination Contact    6/2/2022  Called Felisha ([phone]) to complete a 6-month telephone assessment. No answer; left VM message requesting for a callback.  Request for CMS to mail out the Unable to Reach letter to Felisha today.    6/1/2022  Called Felisha ([phone]) to complete a 6-month telephone assessment. No answer; left VM message requesting for a callback.  Will try again later.    Jose Mata RN, PHN  [facility] Care Coordinator  Phone: [phone]  Fax [phone]   [email or web address]

## 2022-06-06 NOTE — PROGRESS NOTES
"[facility] Care Coordination Contact    Per CC, mailed client an \"Unable to Contact\" letter.  Mailed member [employer] Self Report Health Risk Assessment with self-addressed return envelope & supporting documents as required.     Margy Mercado  Care Management Specialist  [facility]  [phone]   "

## 2022-06-09 NOTE — PROGRESS NOTES
[facility] Care Coordination Contact    [facility] Six-Month Telephone Assessment    Called member to complete a 6-month telephone assessment. Left a message requesting a return call to schedule HRA.   Completed 4 attempts to reach client with no response.  Member is officially unable to contact effective today.  Updated health plan required [facility] POC.    Follow-up Plan: CC will attempt to reach member in six months.    This CC note routed to PCP.    Jose Mata RN, PHN  [facility] Care Coordinator  Phone: [phone]  Fax [phone]   [email or web address]

## 2022-08-22 ENCOUNTER — PATIENT OUTREACH (OUTPATIENT)
Dept: GERIATRIC MEDICINE | Facility: CLINIC | Age: 77
End: 2022-08-22

## 2022-08-23 NOTE — PROGRESS NOTES
CC updated program tasks and targets for [facility] [facility] Launch.     Jose Mata RN, N  [facility] Care Coordinator  Phone: [phone]   Fax [phone]   [email or web address]

## 2022-09-27 ENCOUNTER — PATIENT OUTREACH (OUTPATIENT)
Dept: GERIATRIC MEDICINE | Facility: CLINIC | Age: 77
End: 2022-09-27

## 2022-09-27 NOTE — PROGRESS NOTES
Opened encounter to close episode per regulatory for Sona Tobar.    JOHANNA Bowen  [facility] Lead Care Coordinator  Cell: [phone]

## 2022-09-27 NOTE — PROGRESS NOTES
Opened encounter to open episode per regulatory for [employer] [facility].    JOHANNA Bowen   [facility] Lead Care Coordinator  Cell: [phone]

## 2022-10-24 ENCOUNTER — PATIENT OUTREACH (OUTPATIENT)
Dept: GERIATRIC MEDICINE | Facility: CLINIC | Age: 77
End: 2022-10-24

## 2022-10-24 NOTE — PROGRESS NOTES
[facility] Care Coordination Contact     Called member to schedule annual HRA home visit. Left a message requesting a return call to schedule HRA. Request for CMS to mail out the Unable to Reach letter today.         Jose Mata RN, PHN  [facility] Care Coordinator  Phone: [phone]   Fax [phone]   [email or web address]

## 2022-10-24 NOTE — LETTER
October 25, 2022    Important Med[employer] Information    FELISHA MARTINEZ LANDON  [address] [place] [id number]  I've Tried to Contact You  Dear Felisha,  My name is Jose Mata RN, and I am your Care Coordinator. I have been trying to contact you, but have not been able to reach you.  As a Care Coordinator, I am here to help. My role is to make sure that your health plan is working for you. I am available to:     Review your health care needs with you over the phone or in-person     Provide support for and information about covered services or supplies to help keep you safe and healthy in your home    Answer questions about your insurance     Help you find a provider, such as a doctor or dentist, to meet your unique needs  I can also help you schedule a free physical at your clinic. To schedule an appointment, please call me at [phone] Monday-Friday between 8am-5pm TTY: 711.  I have included a copy of a Health Risk Assessment. Please fill it out and return it in the included envelope I have also included a document that provides you with more information about my role as your Care Coordinator and how I can help with your medical, social and everyday needs.     Questions?  Please call me at the phone number listed above. If you d like, a friend or family member may call for you.  For general questions, call [employer] Customer Service at [phone] or [phone] (toll free) from 8 a.m. - 8 p.m. Central, seven days a week. Access to representatives may be limited at times. TTY: 717.  Sincerely,    Jose Mata RN    E-mail: [email or web address]  Phone: [phone]      [employer]    cc: member records                                                                                            CB5 ([facility] [place]) (5-2020)    Civil Rights Notice  Discrimination is against the law. [employer] does not discriminate on the basis of any of the following:    Race    Color    National  Origin    Creed    Cheondoism    Age    Public Assistance Status    Receipt of Health Care Services    Disability (including physical or mental impairment)    Sex (including sex stereotypes and gender identity)    Marital Status    Political Beliefs    Medical Condition    Genetic Information    Sexual Orientation    Claims Experience    Medical History    Health Status    Auxiliary Aids and Services:  [employer] provides auxiliary aids and services, like qualified interpreters or information in accessible formats, free of charge and in a timely manner, to ensure an equal opportunity to participate in our health care programs. Contact [employer] at [employer]/contact medicaid or call [phone] (toll free); TTY:714 or at [employer]/contactTRAILBLAZE FITNESS CONSULTINGcaid.    Language Assistance Services:  [employer] provides translated documents and spoken language interpreting, free of charge and in a timely manner, when language assistance services are necessary to ensure limited English speakers have meaningful access to our information and services. Contact [employer] at [phone] (toll free); TTY: 717 or [employer]/contactPureflection Day Spa & Hair Studioid.     Civil Rights Complaints  You have the right to file a discrimination complaint if you believe you were treated in a discriminatory way by [facility]. You may contact any of the following four agencies directly to file a discrimination complaint.        U.S. Department of Health and Human Services  Office for Civil Rights (OCR)  You have the right to file a complaint with the OCR, a federal agency, if you believe you have been discriminated against because of any of the following:    Race    Disability    Color    Sex    National Origin    Age    Cheondoism (in some cases)    Contact the OCR directly to file a complaint:         Director         U.S. Department of Health and Human Services  Office for Civil Rights         [address] [place] [address]          Customer Response Center: Toll-free: [phone]          TDD: [phone]         Email: [email or web address]    [place] Department of Human Rights (MDHR)  In [place], you have the right to file a complaint with the MDHR if you believe you have been discriminated against because of any of the following:      Race    Color    National Origin    Tenriism    Creed    Sex    Sexual Orientation    Marital Status    Public Assistance Status    Disability    Contact the MDHR directly to file a complaint:         [place] Department of Human Rights         [address] [place] [address]         [phone] (voice)          [phone] (toll free)         718 or [phone] ([place] Relay)         [phone] (Fax)         [email or web address] (Email)     [place] Department of Human Services (DHS)  You have the right to file a complaint with [facility] if you believe you have been discriminated against in our health care programs because of any of the following:    Race    Color    National Origin    Creed    Tenriism    Age    Public Assistance Status    Receipt of Health Care Services    Disability (including physical or mental impairment)    Sex (including sex stereotypes and gender identity)    Marital Status    Political Beliefs    Medical Condition    Genetic Information    Sexual Orientation    Claims Experience    Medical History    Health Status    Complaints must be in writing and filed within 180 days of the date you discovered the alleged discrimination. The complaint must contain your name and address and describe the discrimination you are complaining about. After we get your complaint, we will review it and notify you in writing about whether we have authority to investigate. If we do, we will investigate the complaint.      [facility] will notify you in writing of the investigation s outcome. You have a right to appeal the outcome if you disagree with the decision. To  appeal, you must send a written request to have [facility] review the investigation outcome. Be brief and state why you disagree with the decision. Include additional information you think is important.      If you file a complaint in this way, the people who work for the agency named in the complaint cannot retaliate against you. This means they cannot punish you in any way for filing a complaint. Filing a complaint in this way does not stop you from seeking out other legal or administration actions.     Contact [facility] directly to file a discrimination complaint:        Civil Rights Coordinator        [facility]        Equal Opportunity and Access Division        [address] [address]        [place] [address] [phone] (voice) or use your preferred relay service     [employer] Complaint Notice   You have the right to file a complaint with [employer] if you believe you have been discriminated against because of any of the following:       Medical condition    Health status    Receipt of health care services    Claims experience    Medical history    Genetic information    Disability (including mental or physical impairment)    Marital status    Age    Sex (including sex stereotypes and gender identity)    Sexual orientation    National origin    Race    Color    Moravian    Creed    Public assistance status    Political beliefs    You can file a complaint and ask for help in filing a complaint in person or by mail, phone, fax, or email at:     [employer] Civil Rights Coordinator  [facility] [employer]  [address], [place] [address] [phone] (voice and fax) or FYJ:046  Email: [email or web address]    American Indians can begin or continue to use [place] and  Health Services (S) clinics. We will not require prior approval or impose any conditions for you to get services at these clinics. For elders age 65 years and older this includes Elderly Waiver (EW)  services accessed through the [employer]. If a doctor or other provider in a [facility] clinic refers you to a provider in our network, we will not require you to see your primary care provider prior to the referral.

## 2022-10-25 NOTE — PROGRESS NOTES
"[facility] Care Coordination Contact    Per CC, mailed client an \"Unable to Contact\" letter.  Mailed member [employer] Self Report Health Risk Assessment with self-addressed return envelope & supporting documents as required.     Damaris Chaves  [facility]  Case Management Specialist  [phone]    "

## 2022-10-31 ENCOUNTER — PATIENT OUTREACH (OUTPATIENT)
Dept: GERIATRIC MEDICINE | Facility: CLINIC | Age: 77
End: 2022-10-31

## 2022-10-31 NOTE — PROGRESS NOTES
[facility] Care Coordination Contact    Completed 4 attempts to reach client with no response.  Member is officially unable to contact effective today.  Completed MMIS entry.  Completed health plan required [facility] POC.    Follow-up Plan: CC will attempt to reach member in six months.    This CC is not able to route note to PCP due to Aftab Clayton MD no longer working at the clinic, and Felisha has not selected a new PCP.  According to EMR, the last clinic visit Felisha had was on 11/24/2020.      CC made phone calls to [employer]: [phone] and [facility] [phone] and confirmed Felisha's telephone number and address to be the same.  [employer] does not have the correct phone # in their system - that cell phone # they have belongs to someone else when CC called.  [facility] reviewed claims and it doesn't show that she has established care with another care system either.  [facility] representative report there have no claims submitted in 2022, and some claims submitted by [facility] in 2021.        Jose Mata RN, PHN  [facility] Care Coordinator  Phone: [phone]   Fax [phone]   [email or web address]

## 2023-04-28 ENCOUNTER — PATIENT OUTREACH (OUTPATIENT)
Dept: GERIATRIC MEDICINE | Facility: CLINIC | Age: 78
End: 2023-04-28
Payer: COMMERCIAL

## 2023-04-28 NOTE — PROGRESS NOTES
[facility] Care Coordination Contact        Called member to complete 6-month telephone assessment.  Offered Felisha a home visit to complete HRA since she was [facility] in October, 2022.  Felisha agreed.  HRA has been scheduled for 5/2/2023 at 10:00 AM.     Jose Mata RN, PHN  [facility] Care Coordinator  Phone: [phone]   Fax [phone]   [email or web address]

## 2023-05-01 ENCOUNTER — PATIENT OUTREACH (OUTPATIENT)
Dept: GERIATRIC MEDICINE | Facility: CLINIC | Age: 78
End: 2023-05-01
Payer: COMMERCIAL

## 2023-05-01 NOTE — PROGRESS NOTES
Encounter opened due to Regulatory Compass Ekaterina Update to close FVP Program.    Corrina Russell  Case Management Specialist   [facility]  [phone]

## 2023-05-01 NOTE — PROGRESS NOTES
[facility] Care Coordination Contact    Called member to schedule annual HRA home visit. HRA has been scheduled for 5/2/2023 at 10:00 AM.     Jose Mata RN, PHN  [facility] Care Coordinator  Phone: [phone]   Fax [phone]   [email or web address]

## 2023-05-01 NOTE — PROGRESS NOTES
Encounter opened due to Regulatory Compass Ekaterina Update to open FVP Program.    Corrina Russell  Case Management Specialist   [facility]  [phone]

## 2023-05-02 ENCOUNTER — PATIENT OUTREACH (OUTPATIENT)
Dept: GERIATRIC MEDICINE | Facility: CLINIC | Age: 78
End: 2023-05-02
Payer: COMMERCIAL

## 2023-05-02 SDOH — HEALTH STABILITY: PHYSICAL HEALTH: ON AVERAGE, HOW MANY MINUTES DO YOU ENGAGE IN EXERCISE AT THIS LEVEL?: 20 MIN

## 2023-05-02 SDOH — ECONOMIC STABILITY: TRANSPORTATION INSECURITY
IN THE PAST 12 MONTHS, HAS LACK OF TRANSPORTATION KEPT YOU FROM MEETINGS, WORK, OR FROM GETTING THINGS NEEDED FOR DAILY LIVING?: NO

## 2023-05-02 SDOH — ECONOMIC STABILITY: TRANSPORTATION INSECURITY
IN THE PAST 12 MONTHS, HAS THE LACK OF TRANSPORTATION KEPT YOU FROM MEDICAL APPOINTMENTS OR FROM GETTING MEDICATIONS?: NO

## 2023-05-02 SDOH — ECONOMIC STABILITY: INCOME INSECURITY: IN THE LAST 12 MONTHS, WAS THERE A TIME WHEN YOU WERE NOT ABLE TO PAY THE MORTGAGE OR RENT ON TIME?: NO

## 2023-05-02 SDOH — ECONOMIC STABILITY: FOOD INSECURITY: WITHIN THE PAST 12 MONTHS, YOU WORRIED THAT YOUR FOOD WOULD RUN OUT BEFORE YOU GOT MONEY TO BUY MORE.: NEVER TRUE

## 2023-05-02 SDOH — ECONOMIC STABILITY: FOOD INSECURITY: WITHIN THE PAST 12 MONTHS, THE FOOD YOU BOUGHT JUST DIDN'T LAST AND YOU DIDN'T HAVE MONEY TO GET MORE.: NEVER TRUE

## 2023-05-02 SDOH — HEALTH STABILITY: PHYSICAL HEALTH: ON AVERAGE, HOW MANY DAYS PER WEEK DO YOU ENGAGE IN MODERATE TO STRENUOUS EXERCISE (LIKE A BRISK WALK)?: 7 DAYS

## 2023-05-02 ASSESSMENT — SOCIAL DETERMINANTS OF HEALTH (SDOH)
IN A TYPICAL WEEK, HOW MANY TIMES DO YOU TALK ON THE PHONE WITH FAMILY, FRIENDS, OR NEIGHBORS?: MORE THAN THREE TIMES A WEEK
HOW HARD IS IT FOR YOU TO PAY FOR THE VERY BASICS LIKE FOOD, HOUSING, MEDICAL CARE, AND HEATING?: NOT HARD AT ALL
HOW OFTEN DO YOU ATTENT MEETINGS OF THE CLUB OR ORGANIZATION YOU BELONG TO?: NEVER
DO YOU BELONG TO ANY CLUBS OR ORGANIZATIONS SUCH AS CHURCH GROUPS UNIONS, FRATERNAL OR ATHLETIC GROUPS, OR SCHOOL GROUPS?: NO
HOW OFTEN DO YOU ATTEND CHURCH OR RELIGIOUS SERVICES?: NEVER
HOW OFTEN DO YOU GET TOGETHER WITH FRIENDS OR RELATIVES?: MORE THAN THREE TIMES A WEEK

## 2023-05-02 ASSESSMENT — LIFESTYLE VARIABLES
AUDIT-C TOTAL SCORE: 3
HOW OFTEN DO YOU HAVE SIX OR MORE DRINKS ON ONE OCCASION: NEVER
HOW OFTEN DO YOU HAVE A DRINK CONTAINING ALCOHOL: 2-3 TIMES A WEEK
HOW MANY STANDARD DRINKS CONTAINING ALCOHOL DO YOU HAVE ON A TYPICAL DAY: 1 OR 2
SKIP TO QUESTIONS 9-10: 1

## 2023-05-02 ASSESSMENT — ACTIVITIES OF DAILY LIVING (ADL): DEPENDENT_IADLS:: INDEPENDENT

## 2023-05-03 ENCOUNTER — PATIENT OUTREACH (OUTPATIENT)
Dept: GERIATRIC MEDICINE | Facility: CLINIC | Age: 78
End: 2023-05-03
Payer: COMMERCIAL

## 2023-05-03 NOTE — PROGRESS NOTES
[facility] Care Coordination Contact    [facility] Initial Assessment     Home visit for Initial Health Risk Assessment with Felisha Brown completed on May 2, 2023    Type of residence:: Apartment - handicap accessible  Current living arrangement:: I live alone      Current Care Plan  Member currently receiving the following home care services: Nonw    Member currently receiving the following community resources: None    Medication Review  Medication reconciliation completed in Epic: Yes  Medication set-up & administration: Independent-does not set up.  Self-administers medications.  Medication Risk Assessment Medication (1 or more, place referral to MTM): N/A: No risk factors identified  MTM Referral Placed: No: No risk factors idenified    Mental/Behavioral Health   Depression Screening:   PHQ-2 Total Score (Adult) - Positive if 3 or more points; Administer PHQ-9 if positive: 0       Mental health DX:: Yes   Mental health DX how managed:: None    No current MH services- Felisha declined any mental health services.    Falls Assessment:   Fallen 2 or more times in the past year?: No   Any fall with injury in the past year?: No    ADL/IADL Dependencies:   Dependent ADLs:: Independent  Dependent IADLs:: Independent    [facility] sponsored benefits: Shared information re: Silver Sneakers/gym memberships, ASA, Calcium +D.    PCA Assessment completed at visit: Not Applicable     Elderly Waiver Eligibility: N/A    Care Plan & Recommendations: Felisha is independent with self-cares and wishes to continue to live in her apartment for as long as possible.  Talons needs are met by current supports and help from others.        See CC for detailed assessment information.    Follow-Up Plan: Member informed of future contact, plan to f/u with member with a 6 month telephone assessment.  Contact information shared with member and family, encouraged member to call with any questions or concerns at  any time.    [place] care continuum providers: Please see Snapshot and Care Management Flowsheets for Specific details of care plan.    This CC note routed to PCP.     Jose Mata RN, PHN  [facility] Care Coordinator  Phone: [phone]   Fax [phone]   [email or web address]

## 2023-05-03 NOTE — LETTER
May 3, 2023    Important Medica Information    FELISHA MARTINEZ BARBI  [address] [place] [id number]  Your Care Plan  Dear Felisha,  When we spoke recently, I promised to send you a Care Plan. The plan enclosed is a summary of our discussion. It includes the steps we agreed would help you meet your health goals. In addition, I can help you with:  Uqvsyao-X-XltpVO  This program is available to members who need a ride to medical and dental visits. To schedule a ride, call [phone] or [phone] (toll free). TTY: 711. You can call Monday - Thursday 8 a.m. to 5 p.m. and Fridays 9 a.m. to 5 p.m.  One Pass  One Pass is your no-cost, complete, fitness solution for your mind and body. To learn more visit [employer]/fitness or call One Pass, toll-free 1 [phone] (TTY: 711) 8 a.m. to 9 p.m. Monday-Friday.  Health Care Directive   This form helps you outline your health care wishes. You can request a form from me and I will answer any questions you have before you discuss it with your doctor.   Annual Physical  Take a key step on your path to good health and set up an annual physical at your clinic.  Questions?  Call me at [phone] Monday-Friday between 8am and 5pm.  TTY: 711. As we discussed, I plan to be in touch with you again in 6 months to follow up via phone.  Sincerely,    Jose Mata RN    E-mail: [email or web address]  Phone: [phone]      [employer]    cc: member records

## 2023-05-03 NOTE — PROGRESS NOTES
[facility] Care Coordination Contact    Received after visit chart from care coordinator.  Completed following tasks: Mailed copy of care plan to client and Mailed Safe Medication Disposal      Corrina Russell  Case Management Specialist   [facility]  [phone]

## 2023-11-28 ENCOUNTER — PATIENT OUTREACH (OUTPATIENT)
Dept: GERIATRIC MEDICINE | Facility: CLINIC | Age: 78
End: 2023-11-28
Payer: COMMERCIAL

## 2023-11-28 NOTE — PROGRESS NOTES
[facility] Care Coordination Contact    [facility] Six-Month Telephone Assessment    6 month telephone assessment completed on 11/28/2023    ER visits: No  Hospitalizations: No  TCU stays: No  Significant health status changes: None reported.  Falls/Injuries: No  ADL/IADL changes: No  Changes in services: No    Caregiver Assessment follow-up:  N/A    Goals: See POC in chart for goal progress documentation.      Will see member in 6 months for an annual health risk assessment.   Encouraged member to call CC with any questions or concerns in the meantime.     Jose Mata RN, PHN  [facility] Care Coordinator  Phone: [phone]  Fax [phone]   [email or web address]

## 2024-02-28 ENCOUNTER — PATIENT OUTREACH (OUTPATIENT)
Dept: GERIATRIC MEDICINE | Facility: CLINIC | Age: 79
End: 2024-02-28
Payer: COMMERCIAL

## 2024-02-28 NOTE — PROGRESS NOTES
Called member to schedule annual HRA home visit. Left a message requesting a return call to schedule HRA.    Jose Mata RN, BSN, PHN  [facility] Care Coordinator  Phone: [phone]   Fax [phone]   [email or web address]

## 2024-02-28 NOTE — LETTER
March 4, 2024    Important Medica Information    FELISHA MARTINEZ BARBI  [address] [place] [phone]  I've Tried to Contact You  Dear Felisha,  My name is  Jose Mata RN, and I am your Care Coordinator. I have been trying to contact you, but have not been able to reach you.  As a Care Coordinator, I am here to help. My role is to make sure that your health plan is working for you. I am available to:  Review your health care needs with you over the phone or in-person   Provide support for and information about covered services or supplies to help keep you safe and healthy in your home  Answer questions about your insurance   Help you find a provider, such as a doctor or dentist, to meet your unique needs  I can also help you schedule a free physical at your clinic. To schedule an appointment, please call me at [phone] Monday-Friday between 8am-5pm TTY: 711.  I have included a copy of a Member Engagement Questionnaire. Please fill it out and return it in the included envelope I have also included a document that provides you with more information about my role as your Care Coordinator and how I can help with your medical, social and everyday needs.     Questions?  Please call me at the phone number listed above. If you d like, a friend or family member may call for you.  For general questions, call:   [employer] Member Services at [phone] between the hours of 8 a.m. to 9 p.m. Central, seven days a week. TTY: 711.  Please note that access to a representative may be limited during certain times of the year.    Sincerely,    Jose Mata RN    E-mail: [email or web address]  Phone: [phone]      [employer]      cc: member records                                                                      [employer] DUAL Solution  and [employer] AccessAbility Solution  Enhanced are O D-SNPs that contract with both Medicare and the [place] Medical Assistance (Medicaid) program to  provide benefits of both programs to enrollees. Enrollment in [employer] DUAL Solution and [employer] AccessAbility Solution Enhanced depends on contract renewal.      2023 [employer].

## 2024-02-29 NOTE — PROGRESS NOTES
Second attempt phone call to member to schedule annual HRA home visit. Left a message requesting a return call to schedule HRA.    Jose Mata RN, BSN, PHN  [facility] Care Coordinator  Phone: [phone]   Fax [phone]   [email or web address]

## 2024-03-04 NOTE — PROGRESS NOTES
"Per CC, mailed client an \"Unable to Contact\" letter.  Mailed member [employer] Member Engagement Questionnaire with self-addressed return envelope & supporting documents as required.     Corrina Russell  Case Management Specialist   [facility]  [phone]    "

## 2024-03-08 NOTE — PROGRESS NOTES
[facility] Care Coordination Contact    Completed 4 attempts to reach client with no response.  Member is officially unable to contact effective today.  Completed MMIS entry.  Completed health plan required [facility] POC.    Follow-up Plan: CC will attempt to reach member in six months.    This CC note routed to PCP:   No primary care provider (PCP) has been established for the member after Dr. Aftab Echevarria left the clinic. Chart review indicates  member has not seen any medical provider since November 24, 2020.     Jose Mata RN, BSN, PHN  [facility] Care Coordinator  Phone: [phone]   Fax [phone]   [email or web address]

## 2024-03-12 ENCOUNTER — PATIENT OUTREACH (OUTPATIENT)
Dept: GERIATRIC MEDICINE | Facility: CLINIC | Age: 79
End: 2024-03-12
Payer: COMMERCIAL

## 2024-08-28 ENCOUNTER — PATIENT OUTREACH (OUTPATIENT)
Dept: GERIATRIC MEDICINE | Facility: CLINIC | Age: 79
End: 2024-08-28
Payer: COMMERCIAL

## 2024-08-28 NOTE — PROGRESS NOTES
Last read by Maritza Rojas at 9:04 AM on 7/8/2022. Closing encounter.    [facility] Care Coordination Contact    Called member and left a voice mail message to remind member to schedule Wellness Exam. Left contact info.     DAREN Arteaga  [facility]  [phone]

## 2024-09-09 ENCOUNTER — PATIENT OUTREACH (OUTPATIENT)
Dept: GERIATRIC MEDICINE | Facility: CLINIC | Age: 79
End: 2024-09-09
Payer: COMMERCIAL

## 2024-09-09 NOTE — PROGRESS NOTES
TRANSITIONS OF CARE (JULIAN) LOG    JULIAN tasks should be completed by the CC within one (1) business day of notification of each transition. Follow up contact with member is required after return to their usual care setting.  Note:  If CC finds out about the transitions fifteen (15) days or more after the member has returned to their usual care setting, no JULIAN log is needed. However, the CC should check in with the member to discuss the transition process, any changes needed to the care plan and document it in a case note.     Member Name:  Felisha [facility] Name:  [employer] Member ID#: [id number]    Product: [facility] Care Coordinator Contact:  Jose Mata RN Agency/County/Care System: [facility]   Transition Communication Actions from Care Management Contact   Transition #1   Notification Date: 9/9/24 Transition Date:   9/6/24 Transition From: Home     Is this the member s usual care setting?               yes Transition To: Hospital, [facility]    Transition Type:  Unplanned    Documentation from conversation with the member/responsible party, provider, discharging and receiving facility:   Unable to complete task because member has already been discharged from the hospital.  Chart reviewed showed that member was hospitalized due to syncope and new onset atrial fibrillation.      Transition #2   Notification Date: 9/9/24 Transition Date:   9/9/24 Transition From: Hospital, [facility]      Is this the member s usual care setting?               no Transition To: Home   Transition Type:  Planned    Documentation from conversation with the member/responsible party, provider, discharging and receiving facility:   Date: 9/9/24: Received notification of transition to home.  CC contacted member and left a message requesting a return call.  Member has a follow-up appointment with PCP in 7 days: No: Offered Assistance with setting up a follow up appointment   Member has had a change  in condition: No  Home visit needed: No  Support Plan reviewed and updated.  The following home based services None were resumed.  New referrals placed: No  Transition log completed.   PCP, Aftab Clayton, notified of transition back to home via EMR.    Jose Mata RN, BSN, PHN  [facility] Care Coordinator  Phone: [phone]   Fax [phone]   [email or web address]         *RETURN TO USUAL CARE SETTING: *Complete tasks below when the member is discharging TO their usual care setting within one (1) business day of notification..      For situations where the Care Coordinator is notified of the discharge prior to the date of discharge, the Care Coordinator must follow up with the member or designated representative to confirm that discharge actually occurred and discuss required JULIAN tasks as outlined in the JULIAN Instructions.  (This includes situations where it may be a  new  usual care setting for the member. (i.e., a community member who decides upon permanent nursing home placement following hospitalization and rehab).    Discuss with Member/Responsible Party:    Check  Yes  - if the member, family member and/or SNF/facility staff manages the following:    If  No  provide explanation in the comments section.          Date completed: 9/9/2024 Communicated with member or their designated representative about the following:  care transition process; about changes to the member s health status; plan of care updates; education about transitions and how to prevent unplanned transitions/readmissions    Four Pillars for Optimal Transition:    Check  Yes  - if the member, family member and/or SNF/facility staff manages the following:    If  No  provide explanation in the comments section.          []  Yes     [x]  No Does the member have a follow-up appointment scheduled with primary care or specialist? (Mental health hospitalizations--the appt. should be w/in 7 days)              For mental health  hospitalizations:  []  Yes     []  No     Does the member have a follow-up appointment scheduled with a mental health practitioner within 7 days of discharge?  []  Yes     []  No     Has a medication review been completed with member? If no, refer to PCP, home care nurse, MTM, pharmacist  []  Yes     []  No     Can the member manage their medications or is there a system in place to manage medications (e.g. home care set-up)?         []  Yes     []  No     Can the member verbalize warning signs and symptoms to watch for and how to respond?  []  Yes     []  No     Does the member have a copy of and understand their discharge instructions?  If no, assist to obtain copy of discharge instructions, review discharge instructions, and assist to contact PCP to discuss questions about their recent hospitalization.  []  Yes     []  No     Does the member have adequate food, housing and transportation?  If no, add goal and discuss additional supports available to the member                                                                                                                                                                                 []  Yes     []  No     Is the member safe in their home?  If no, document needs and support provided                                                                                                                                                                          []  Yes     []  No     Are there any concerns of vulnerability, abuse, or neglect?  If yes, document concerns and actions taken by Care Coordinator as a mandated                                                                                                                                                                              [x]  Yes     []  No     Does the member use a Personal Health Care Record?  Check  Yes  if visit summary, discharge summary, and/or healthcare summary are being used as a PHR.                                                                                                                                                                                   []  Yes     []  No     Have you reviewed the discharge summary with the member? If  No  provide explanation in comments.  [x]  Yes     []  No     Have you updated the member s care plan/support plan? Add new diagnosis, medications, treatments, goals & interventions, as applicable. If No, provide explanation in comments.    Comments:         CC is not able to complete some tasks above because CC was not able to contact member.  CC left two vm messages for member reminding her to establish care with a new PCC and PCP. Informed her that according to her EMR, she has not been in to see a medical doctor since 11/24/2020 and how important it is to have a PCP in order to help her to maintain good health.      Unable to route to a clinic or PCP because she has not establish care with one since 2020.     Notes from conversation with the member/responsible party, provider, discharging and receiving facility (as applicable):

## 2024-09-16 ENCOUNTER — PATIENT OUTREACH (OUTPATIENT)
Dept: GERIATRIC MEDICINE | Facility: CLINIC | Age: 79
End: 2024-09-16
Payer: COMMERCIAL

## 2024-09-16 NOTE — LETTER
September 16, 2024    Important [employer] Information    FELISHA MARTINEZ BARBI  [address] [place] [id number]  I've Tried to Contact You  Dear Felisha,  My name is  Jose Mata RN, and I am your Care Coordinator. I have been trying to contact you, but have not been able to reach you.  As a Care Coordinator, I am here to help. My role is to make sure that your health plan is working for you. I am available to:  Review your health care needs with you over the phone or in-person   Provide support for and information about covered services or supplies to help keep you safe and healthy in your home  Answer questions about your insurance   Help you find a provider, such as a doctor or dentist, to meet your unique needs  I can also help you schedule a free physical at your clinic. To schedule an appointment, please call me at [phone] Monday-Friday between 8am-5pm TTY: 711.  I have included a copy of a Member Engagement Questionnaire. Please fill it out and return it in the included envelope I have also included a document that provides you with more information about my role as your Care Coordinator and how I can help with your medical, social and everyday needs.     Questions?  Please call me at the phone number listed above. If you d like, a friend or family member may call for you.  For general questions, call:   [employer] Member Services at [phone] between the hours of 8 a.m. to 9 p.m. Central, seven days a week. TTY: [phone].  Please note that access to a representative may be limited during certain times of the year.    Sincerely,    Jose Mata RN    E-mail: [email or web address]  Phone: [phone]      [employer]      cc: member records                                                                      [employer]  and [employer] Access[employer]  Enhanced are HMO D-SNPs that contract with both Medicare and the [place] Medical Assistance (Medicaid)  program to provide benefits of both programs to enrollees. Enrollment in [employer] and [employer] Access[employer] Enhanced depends on contract renewal.      2023 [employer].

## 2024-09-16 NOTE — PROGRESS NOTES
"[facility] Care Coordination Contact    Per CC, mailed client an \"Unable to Contact\" letter.  Mailed member [employer] Member Engagement Questionnaire with self-addressed return envelope & [employer] Leave Behind document.     Corrina Russell  Case Management Specialist   [facility]  [phone]    "

## 2024-09-27 ENCOUNTER — PATIENT OUTREACH (OUTPATIENT)
Dept: GERIATRIC MEDICINE | Facility: CLINIC | Age: 79
End: 2024-09-27
Payer: COMMERCIAL

## 2024-09-27 NOTE — PROGRESS NOTES
[facility] Six-Month Telephone Assessment    6 month telephone assessment:    Completed 4 attempts to reach client with no response.  Member is officially unable to contact effective today.  9/27/2024 - Update POC of UTC for 6 months.  9/16/2024 - Task CMS to mail out UTR letter  9/12/2024 - Second attempt  9/9/2024 - First attempt    MMIS entry: N/A  Updated health plan required UTC POC.    Follow-up Plan: CC will attempt to reach member in six months.    This CC note routed to PCP: N/A, no current PCP listed.     Jose Mata RN, BSN, PHN  [facility] Care Coordinator  Phone: [phone]   Fax [phone]   [email or web address]

## 2024-10-02 ENCOUNTER — PATIENT OUTREACH (OUTPATIENT)
Dept: GERIATRIC MEDICINE | Facility: CLINIC | Age: 79
End: 2024-10-02
Payer: COMMERCIAL

## 2024-10-02 NOTE — PROGRESS NOTES
CC called and said that she would like to switch her primary clinic to [facility] because it is closer to her house and is on a direct bus line.  CC assisted the member in scheduling an appointment closer to [facility] house[facility], [address] [place] [address].  The appointment is scheduled for 10/28/2024 at 9:30 am with provider Margo Mello NP. She will have a physical exam to establish care at the new clinic. Felisha will call this CC once she attends the appointment, and CC will process the clinic change to [facility].       Jose Mata RN, BSN, PHN  [facility] Care Coordinator  Phone: [phone]   Fax [phone]   [email or web address]

## 2024-10-17 ENCOUNTER — PATIENT OUTREACH (OUTPATIENT)
Dept: GERIATRIC MEDICINE | Facility: CLINIC | Age: 79
End: 2024-10-17
Payer: COMMERCIAL

## 2024-10-17 NOTE — PROGRESS NOTES
[facility] Care Coordination Contact    Second Attempt for Wellness Exam.Called member and left a voice mail message to remind member to schedule Wellness Exam. Left contact info.     DAREN Arteaga  [facility]  [phone]

## 2024-10-31 ENCOUNTER — PATIENT OUTREACH (OUTPATIENT)
Dept: GERIATRIC MEDICINE | Facility: CLINIC | Age: 79
End: 2024-10-31
Payer: COMMERCIAL

## 2024-10-31 NOTE — PROGRESS NOTES
Received a call from Felisha that she attended [facility] [address] [place] [address] on 10/28/2024 at 9:30 am with provider Margo Mello NP.  She likes the clinic and the provider and is requesting to change PCC.  CC will initiate a change of clinic to [facility].     Jose Mata RN, BSN, PHN  [facility] Care Coordinator  Phone: [phone]   Fax [phone]   [email or web address]

## 2024-12-11 ENCOUNTER — PATIENT OUTREACH (OUTPATIENT)
Dept: GERIATRIC MEDICINE | Facility: CLINIC | Age: 79
End: 2024-12-11
Payer: COMMERCIAL

## 2024-12-11 NOTE — PROGRESS NOTES
[facility] Care Coordination Contact    No longer active with [facility] case management effective 12/01/2024.  Reason for community disenrollment: Change Care System/PCC to Veda Mata RN, BSN, PHN  [facility] Care Coordinator  Phone: [phone]   Fax [phone]   [email or web address]